# Patient Record
Sex: FEMALE | Race: BLACK OR AFRICAN AMERICAN | NOT HISPANIC OR LATINO | Employment: FULL TIME | URBAN - METROPOLITAN AREA
[De-identification: names, ages, dates, MRNs, and addresses within clinical notes are randomized per-mention and may not be internally consistent; named-entity substitution may affect disease eponyms.]

---

## 2022-08-15 ENCOUNTER — APPOINTMENT (OUTPATIENT)
Dept: ULTRASOUND IMAGING | Facility: CLINIC | Age: 22
End: 2022-08-15
Attending: EMERGENCY MEDICINE
Payer: MEDICAID

## 2022-08-15 ENCOUNTER — HOSPITAL ENCOUNTER (EMERGENCY)
Facility: CLINIC | Age: 22
Discharge: HOME | End: 2022-08-15
Attending: EMERGENCY MEDICINE | Admitting: EMERGENCY MEDICINE
Payer: MEDICAID

## 2022-08-15 VITALS
WEIGHT: 150 LBS | HEIGHT: 60 IN | TEMPERATURE: 97.1 F | DIASTOLIC BLOOD PRESSURE: 76 MMHG | HEART RATE: 85 BPM | RESPIRATION RATE: 16 BRPM | SYSTOLIC BLOOD PRESSURE: 121 MMHG | OXYGEN SATURATION: 99 % | BODY MASS INDEX: 29.45 KG/M2

## 2022-08-15 DIAGNOSIS — O26.891 ABDOMINAL PAIN DURING PREGNANCY IN FIRST TRIMESTER: ICD-10-CM

## 2022-08-15 DIAGNOSIS — R10.9 ABDOMINAL PAIN DURING PREGNANCY IN FIRST TRIMESTER: ICD-10-CM

## 2022-08-15 DIAGNOSIS — K29.00 ACUTE SUPERFICIAL GASTRITIS WITHOUT HEMORRHAGE: ICD-10-CM

## 2022-08-15 PROBLEM — F12.90 MARIJUANA USE: Status: ACTIVE | Noted: 2018-05-08

## 2022-08-15 PROBLEM — O99.019 ANTEPARTUM ANEMIA: Status: ACTIVE | Noted: 2018-05-08

## 2022-08-15 PROBLEM — N20.0 RECURRENT KIDNEY STONES: Status: ACTIVE | Noted: 2020-03-01

## 2022-08-15 LAB
ALBUMIN SERPL-MCNC: 3.8 G/DL (ref 3.5–5)
ALP SERPL-CCNC: 66 U/L (ref 45–120)
ALT SERPL W P-5'-P-CCNC: <9 U/L (ref 0–45)
ANION GAP SERPL CALCULATED.3IONS-SCNC: 7 MMOL/L (ref 5–18)
AST SERPL W P-5'-P-CCNC: 15 U/L (ref 0–40)
BILIRUB DIRECT SERPL-MCNC: <0.1 MG/DL
BILIRUB SERPL-MCNC: 0.2 MG/DL (ref 0–1)
BUN SERPL-MCNC: 10 MG/DL (ref 8–22)
CALCIUM SERPL-MCNC: 8.9 MG/DL (ref 8.5–10.5)
CHLORIDE BLD-SCNC: 105 MMOL/L (ref 98–107)
CO2 SERPL-SCNC: 24 MMOL/L (ref 22–31)
CREAT SERPL-MCNC: 0.63 MG/DL (ref 0.6–1.1)
ERYTHROCYTE [DISTWIDTH] IN BLOOD BY AUTOMATED COUNT: 20.4 % (ref 10–15)
GFR SERPL CREATININE-BSD FRML MDRD: >90 ML/MIN/1.73M2
GLUCOSE BLD-MCNC: 88 MG/DL (ref 70–125)
HCG SERPL-ACNC: ABNORMAL MLU/ML (ref 0–4)
HCT VFR BLD AUTO: 31.8 % (ref 35–47)
HGB BLD-MCNC: 10.1 G/DL (ref 11.7–15.7)
LIPASE SERPL-CCNC: 14 U/L (ref 0–52)
MCH RBC QN AUTO: 23 PG (ref 26.5–33)
MCHC RBC AUTO-ENTMCNC: 31.8 G/DL (ref 31.5–36.5)
MCV RBC AUTO: 72 FL (ref 78–100)
PLATELET # BLD AUTO: 310 10E3/UL (ref 150–450)
POTASSIUM BLD-SCNC: 3.8 MMOL/L (ref 3.5–5)
PROT SERPL-MCNC: 7.2 G/DL (ref 6–8)
RBC # BLD AUTO: 4.4 10E6/UL (ref 3.8–5.2)
SODIUM SERPL-SCNC: 136 MMOL/L (ref 136–145)
WBC # BLD AUTO: 13.6 10E3/UL (ref 4–11)

## 2022-08-15 PROCEDURE — 83690 ASSAY OF LIPASE: CPT | Performed by: STUDENT IN AN ORGANIZED HEALTH CARE EDUCATION/TRAINING PROGRAM

## 2022-08-15 PROCEDURE — 76801 OB US < 14 WKS SINGLE FETUS: CPT

## 2022-08-15 PROCEDURE — 85027 COMPLETE CBC AUTOMATED: CPT | Performed by: STUDENT IN AN ORGANIZED HEALTH CARE EDUCATION/TRAINING PROGRAM

## 2022-08-15 PROCEDURE — 99285 EMERGENCY DEPT VISIT HI MDM: CPT | Mod: 25

## 2022-08-15 PROCEDURE — 250N000013 HC RX MED GY IP 250 OP 250 PS 637: Performed by: EMERGENCY MEDICINE

## 2022-08-15 PROCEDURE — 80048 BASIC METABOLIC PNL TOTAL CA: CPT | Performed by: STUDENT IN AN ORGANIZED HEALTH CARE EDUCATION/TRAINING PROGRAM

## 2022-08-15 PROCEDURE — 76705 ECHO EXAM OF ABDOMEN: CPT

## 2022-08-15 PROCEDURE — 82248 BILIRUBIN DIRECT: CPT | Performed by: STUDENT IN AN ORGANIZED HEALTH CARE EDUCATION/TRAINING PROGRAM

## 2022-08-15 PROCEDURE — 84702 CHORIONIC GONADOTROPIN TEST: CPT | Performed by: EMERGENCY MEDICINE

## 2022-08-15 PROCEDURE — 36415 COLL VENOUS BLD VENIPUNCTURE: CPT | Performed by: STUDENT IN AN ORGANIZED HEALTH CARE EDUCATION/TRAINING PROGRAM

## 2022-08-15 RX ORDER — MAGNESIUM HYDROXIDE/ALUMINUM HYDROXICE/SIMETHICONE 120; 1200; 1200 MG/30ML; MG/30ML; MG/30ML
30 SUSPENSION ORAL ONCE
Status: COMPLETED | OUTPATIENT
Start: 2022-08-15 | End: 2022-08-15

## 2022-08-15 RX ADMIN — ALUMINUM HYDROXIDE, MAGNESIUM HYDROXIDE, AND SIMETHICONE 30 ML: 200; 200; 20 SUSPENSION ORAL at 21:12

## 2022-08-15 ASSESSMENT — ENCOUNTER SYMPTOMS
PALPITATIONS: 0
ABDOMINAL PAIN: 1
SEIZURES: 0
CHEST TIGHTNESS: 0
NAUSEA: 0
CHILLS: 0
FEVER: 0
SHORTNESS OF BREATH: 0
VOMITING: 0

## 2022-08-15 ASSESSMENT — ACTIVITIES OF DAILY LIVING (ADL)
ADLS_ACUITY_SCORE: 35
ADLS_ACUITY_SCORE: 35

## 2022-08-16 NOTE — DISCHARGE INSTRUCTIONS
Overall your work-up was reassuring.  Recommend over-the-counter Maalox for management of your symptoms.  Likely this is due to some mild inflammation of your stomach.  If you have escalating lower abdominal pain develop additional concern please return to the emergency department for repeat assessment.

## 2022-08-16 NOTE — ED TRIAGE NOTES
Arrives to ED with c/o sharp epigastric abd pain that began 1 hour PTA. Pt woke from nap with pain. Denies N/V. Approximately 9 weeks pregnant. .      Triage Assessment     Row Name 08/15/22 2026       Triage Assessment (Adult)    Airway WDL WDL       Respiratory WDL    Respiratory WDL WDL       Skin Circulation/Temperature WDL    Skin Circulation/Temperature WDL WDL       Cardiac WDL    Cardiac WDL WDL       Peripheral/Neurovascular WDL    Peripheral Neurovascular WDL WDL       Cognitive/Neuro/Behavioral WDL    Cognitive/Neuro/Behavioral WDL WDL

## 2022-08-16 NOTE — ED PROVIDER NOTES
EMERGENCY DEPARTMENT ENCOUNTER      NAME: Carmela Elizalde  AGE: 22 year old female  YOB: 2000  MRN: [id number]  EVALUATION DATE & TIME: 8/15/2022  8:51 PM    PCP: No primary care provider on file.    ED PROVIDER: Bryce Palafox MD    Chief Complaint   Patient presents with     Abdominal Pain     FINAL IMPRESSION:  1. Acute superficial gastritis without hemorrhage    2. Abdominal pain during pregnancy in first trimester    Pelvic US pending  UA pending    ED COURSE & MEDICAL DECISION MAKING:    Pertinent Labs & Imaging studies reviewed. (See chart for details)  22 year old female presents to the Emergency Department for evaluation of abdominal pain.  Patient is a G4, P3 at 9 weeks gestation by LMP presenting to the emergency department with epigastric abdominal pain.  Patient reports that she took a nap this afternoon and she awoke the pain was present.  Rates it as a 5 out of 10.  Upper abdomen.  Denies lower abdominal discomfort.  This is patient's fourth pregnancy.  No previous complications with her initial 3 pregnancies.  She has not seen any obstetrician in follow-up yet there is been no imaging.  Denies vaginal pain vaginal bleeding or discharge.  On examination patient had focal discomfort to palpation of the mid abdomen.  Primarily in epigastric region.  No guarding or peritoneal signs no appreciable distention or palpable masses.  No other concerning exam findings.  Laboratory testing obtained demonstrating beta-hCG of 171,000.  White blood cell count mildly elevated at 13.6 likely related to pregnancy.  Hemoglobin 10.1.  BMP unremarkable.  She had no right lower quadrant pain to palpation and no left lower quadrant pain for palpation.  Seem less likely that this was pregnancy related based on the location of pain she notes pain.  I favor gastritis as etiology for her discomfort.  She had never suffered from that condition previously.  I recommended right upper quadrant ultrasound to  exclude biliary colic.  This was negative.  I went in and reassessed the patient.  She had no response to the antacid therapy that I administered to the patient.  Repeat examination demonstrating persistent abdominal discomfort in the mid to upper abdomen primarily superior to the umbilicus by a few centimeters.  Given the persistence of her pain, lack of response to an acids and no previous ultrasound to demonstrate intrauterine pregnancy I did recommend a pelvic ultrasound to exclude ectopic pregnancy.  Again no right lower quadrant or left lower quadrant pain to palpation.  Low pretest probability for other acute abdominal pathology.  Still awaiting urinalysis.  still would favor gastritis as the most likely source of her pain.  Overall plan of care is for pelvic ultrasound and repeat assessment post results.  Patient signed out to oncoming physician at this point in care.  Anticipate discharge if pelvic ultrasound demonstrating intrauterine pregnancy without other complications.     At the conclusion of the encounter I discussed the results of all of the tests and the disposition. The questions were answered. The patient or family acknowledged understanding and was agreeable with the care plan.       MEDICATIONS GIVEN IN THE EMERGENCY:  Medications   alum & mag hydroxide-simethicone (MAALOX) suspension 30 mL (30 mLs Oral Given 8/15/22 2112)       NEW PRESCRIPTIONS STARTED AT TODAY'S ER VISIT  New Prescriptions    No medications on file          =================================================================    HPI    Patient information was obtained from: patient     Use of : N/A       Carmela OLIVERA Tonio is a 22 year old female with a pertinent history of antepartum anemia, kidney infection, and recurrent kidney stones who presents to this ED by private vehicle for evaluation of epigastric abdominal pain.    The patient woke up from a nap at ~8:20 PM this evening with sharp epigastric pain which has  remained constant since. The patient is ~9 weeks into her 4th pregnancy. She has 3 children at home. She denies any nausea, vomiting, or vaginal discharge.  She reports eating normally today and did not have this pain, other abdominal pain, or acid reflux in any of her prior pregnancies. Patient denies additional medical concerns or complaints at this time.     Of note, the patient's last menstrual period was ~June 16.    REVIEW OF SYSTEMS   Review of Systems   Constitutional: Negative for chills and fever.   Respiratory: Negative for chest tightness and shortness of breath.    Cardiovascular: Negative for chest pain and palpitations.   Gastrointestinal: Positive for abdominal pain (epigastric, sharp). Negative for nausea and vomiting.   Genitourinary: Negative for vaginal bleeding and vaginal discharge.   Neurological: Negative for seizures and syncope.       PAST MEDICAL HISTORY:  No past medical history on file.    PAST SURGICAL HISTORY:  No past surgical history on file.        CURRENT MEDICATIONS:    No current outpatient medications on file.      ALLERGIES:  No Known Allergies    FAMILY HISTORY:  No family history on file.    SOCIAL HISTORY:   Social History     Socioeconomic History     Marital status: Single       VITALS:  /76   Pulse 85   Temp 97.1  F (36.2  C) (Temporal)   Resp 16   Ht 1.524 m (5')   Wt 68 kg (150 lb)   SpO2 99%   BMI 29.29 kg/m      PHYSICAL EXAM    PHYSICAL EXAM    Constitutional: Well developed, Well nourished, NAD  HENT: Normocephalic, Atraumatic, Bilateral external ears normal, Oropharynx normal, mucous membranes moist, Nose normal. Neck-  Normal range of motion, No tenderness, Supple, No stridor.   Eyes: PERRL, EOMI, Conjunctiva normal, No discharge.   Respiratory: Normal breath sounds, No respiratory distress, No wheezing, Speaks full sentences easily. No cough.   Cardiovascular: Normal heart rate, Regular rhythm, No murmurs Chest wall nontender.    GI: Epigastric  abdominal pain to palpation  : No cva tenderness    Musculoskeletal: 2+ DP pulses. No edema. No cyanosis. Good range of motion in all major joints. No tenderness to palpation. No tenderness of the CTLS spine.   Integument: Warm, Dry, No erythema, No rash. No petechiae.   Neurologic: Alert & oriented x 3, Normal motor function, Normal sensory function, No focal deficits noted.   Psychiatric: Affect normal, Judgment normal, Mood normal. Cooperative.     LAB:  All pertinent labs reviewed and interpreted.  Results for orders placed or performed during the hospital encounter of 08/15/22   Abdomen US, limited (RUQ only)    Impression    IMPRESSION:  1.  Normal limited abdominal ultrasound.       CBC (+ platelets, no diff)   Result Value Ref Range    WBC Count 13.6 (H) 4.0 - 11.0 10e3/uL    RBC Count 4.40 3.80 - 5.20 10e6/uL    Hemoglobin 10.1 (L) 11.7 - 15.7 g/dL    Hematocrit 31.8 (L) 35.0 - 47.0 %    MCV 72 (L) 78 - 100 fL    MCH 23.0 (L) 26.5 - 33.0 pg    MCHC 31.8 31.5 - 36.5 g/dL    RDW 20.4 (H) 10.0 - 15.0 %    Platelet Count 310 150 - 450 10e3/uL   Basic metabolic panel   Result Value Ref Range    Sodium 136 136 - 145 mmol/L    Potassium 3.8 3.5 - 5.0 mmol/L    Chloride 105 98 - 107 mmol/L    Carbon Dioxide (CO2) 24 22 - 31 mmol/L    Anion Gap 7 5 - 18 mmol/L    Urea Nitrogen 10 8 - 22 mg/dL    Creatinine 0.63 0.60 - 1.10 mg/dL    Calcium 8.9 8.5 - 10.5 mg/dL    Glucose 88 70 - 125 mg/dL    GFR Estimate >90 >60 mL/min/1.73m2   Hepatic function panel   Result Value Ref Range    Bilirubin Total 0.2 0.0 - 1.0 mg/dL    Bilirubin Direct <0.1 <=0.5 mg/dL    Protein Total 7.2 6.0 - 8.0 g/dL    Albumin 3.8 3.5 - 5.0 g/dL    Alkaline Phosphatase 66 45 - 120 U/L    AST 15 0 - 40 U/L    ALT <9 0 - 45 U/L   Result Value Ref Range    Lipase 14 0 - 52 U/L   HCG quantitative pregnancy (blood)   Result Value Ref Range    hCG Quantitative 171,116 (H) 0 - 4 mlU/mL     RADIOLOGY:  Reviewed all pertinent imaging. Please see official  radiology report.  Abdomen US, limited (RUQ only)   Final Result   IMPRESSION:   1.  Normal limited abdominal ultrasound.            OB  US 1st trimester w transvag    (Results Pending)     I, Alba Villarreal, am serving as a scribe to document services personally performed by Bryce Palafox MD based on my observation and the provider's statements to me. I, Bryce Palafox MD attest that Alba Villarreal is acting in a scribe capacity, has observed my performance of the services and has documented them in accordance with my direction.    Bryce Palafox MD  [facility] EMERGENCY ROOM  [address] [facility] [facility] [id number] [phone]     Bryce Palafox MD  08/15/22 1970

## 2022-08-16 NOTE — ED NOTES
EMERGENCY DEPARTMENT SIGN OUT NOTE        ED COURSE AND MEDICAL DECISION MAKING  Patient was signed out to me by Dr Bryce Palafox at 10:26 PM.    In brief, Carmela Elizalde is a 22 year old female who initially presented abdominal pain.      Patient woke up from a nap this evening with sharp epigastric pain which has remained constant since. Patient notes she is 9 weeks into her 4th pregnancy. She denies nausea, vomiting, or vaginal discharge. She notes she did not have this pain in any of her other pregnancy's.      At time of sign out, disposition was pending a UA, pelvis ultrasound, and discharge.     Patient's ultrasound was negative.  When I went to go find the patient discussed these findings patient had left.  She had not given a UA as of yet.  I do not see any emergent cause.  Hopefully she will follow-up with her primary.    FINAL IMPRESSION    1. Acute superficial gastritis without hemorrhage    2. Abdominal pain during pregnancy in first trimester        ED MEDS  Medications   alum & mag hydroxide-simethicone (MAALOX) suspension 30 mL (30 mLs Oral Given 8/15/22 2112)       LAB  Labs Ordered and Resulted from Time of ED Arrival to Time of ED Departure   CBC WITH PLATELETS - Abnormal       Result Value    WBC Count 13.6 (*)     RBC Count 4.40      Hemoglobin 10.1 (*)     Hematocrit 31.8 (*)     MCV 72 (*)     MCH 23.0 (*)     MCHC 31.8      RDW 20.4 (*)     Platelet Count 310     HCG QUANTITATIVE PREGNANCY - Abnormal    hCG Quantitative 171,116 (*)    BASIC METABOLIC PANEL - Normal    Sodium 136      Potassium 3.8      Chloride 105      Carbon Dioxide (CO2) 24      Anion Gap 7      Urea Nitrogen 10      Creatinine 0.63      Calcium 8.9      Glucose 88      GFR Estimate >90     HEPATIC FUNCTION PANEL - Normal    Bilirubin Total 0.2      Bilirubin Direct <0.1      Protein Total 7.2      Albumin 3.8      Alkaline Phosphatase 66      AST 15      ALT <9     LIPASE - Normal    Lipase 14     ROUTINE UA WITH  MICROSCOPIC REFLEX TO CULTURE       RADIOLOGY    Abdomen US, limited (RUQ only)   Final Result   IMPRESSION:   1.  Normal limited abdominal ultrasound.            OB  US 1st trimester w transvag    (Results Pending)       DISCHARGE MEDS  New Prescriptions    No medications on file       Devang Bedolla MD  [facility] EMERGENCY ROOM  [address] [place] [id number] [phone]     Devang Bedolla MD  08/15/22 0024

## 2022-10-13 LAB
ABO (EXTERNAL): NORMAL
HEPATITIS B SURFACE ANTIGEN (EXTERNAL): NEGATIVE
HIV1+2 AB SERPL QL IA: NEGATIVE
RH (EXTERNAL): POSITIVE
RUBELLA ANTIBODY IGG (EXTERNAL): NORMAL

## 2022-10-14 DIAGNOSIS — O99.011 ANEMIA COMPLICATING PREGNANCY IN FIRST TRIMESTER: Primary | ICD-10-CM

## 2022-10-14 RX ORDER — METHYLPREDNISOLONE SODIUM SUCCINATE 125 MG/2ML
125 INJECTION, POWDER, LYOPHILIZED, FOR SOLUTION INTRAMUSCULAR; INTRAVENOUS
Status: CANCELLED
Start: 2022-10-17

## 2022-10-14 RX ORDER — MEPERIDINE HYDROCHLORIDE 25 MG/ML
25 INJECTION INTRAMUSCULAR; INTRAVENOUS; SUBCUTANEOUS EVERY 30 MIN PRN
Status: CANCELLED | OUTPATIENT
Start: 2022-10-17

## 2022-10-14 RX ORDER — ALBUTEROL SULFATE 90 UG/1
1-2 AEROSOL, METERED RESPIRATORY (INHALATION)
Status: CANCELLED
Start: 2022-10-17

## 2022-10-14 RX ORDER — ALBUTEROL SULFATE 0.83 MG/ML
2.5 SOLUTION RESPIRATORY (INHALATION)
Status: CANCELLED | OUTPATIENT
Start: 2022-10-17

## 2022-10-14 RX ORDER — HEPARIN SODIUM (PORCINE) LOCK FLUSH IV SOLN 100 UNIT/ML 100 UNIT/ML
5 SOLUTION INTRAVENOUS
Status: CANCELLED | OUTPATIENT
Start: 2022-10-17

## 2022-10-14 RX ORDER — EPINEPHRINE 1 MG/ML
0.3 INJECTION, SOLUTION INTRAMUSCULAR; SUBCUTANEOUS EVERY 5 MIN PRN
Status: CANCELLED | OUTPATIENT
Start: 2022-10-17

## 2022-10-14 RX ORDER — DIPHENHYDRAMINE HYDROCHLORIDE 50 MG/ML
50 INJECTION INTRAMUSCULAR; INTRAVENOUS
Status: CANCELLED
Start: 2022-10-17

## 2022-10-14 RX ORDER — HEPARIN SODIUM,PORCINE 10 UNIT/ML
5 VIAL (ML) INTRAVENOUS
Status: CANCELLED | OUTPATIENT
Start: 2022-10-17

## 2022-12-19 ENCOUNTER — TRANSFERRED RECORDS (OUTPATIENT)
Dept: HEALTH INFORMATION MANAGEMENT | Facility: CLINIC | Age: 22
End: 2022-12-19

## 2022-12-19 LAB — VDRL (SYPHILIS) (EXTERNAL): NONREACTIVE

## 2022-12-22 ENCOUNTER — MEDICAL CORRESPONDENCE (OUTPATIENT)
Dept: HEALTH INFORMATION MANAGEMENT | Facility: CLINIC | Age: 22
End: 2022-12-22

## 2022-12-24 ENCOUNTER — APPOINTMENT (OUTPATIENT)
Dept: CT IMAGING | Facility: CLINIC | Age: 22
End: 2022-12-24
Attending: EMERGENCY MEDICINE
Payer: COMMERCIAL

## 2022-12-24 ENCOUNTER — HOSPITAL ENCOUNTER (EMERGENCY)
Facility: CLINIC | Age: 22
Discharge: HOME | End: 2022-12-24
Attending: EMERGENCY MEDICINE | Admitting: EMERGENCY MEDICINE
Payer: COMMERCIAL

## 2022-12-24 VITALS
RESPIRATION RATE: 16 BRPM | SYSTOLIC BLOOD PRESSURE: 130 MMHG | TEMPERATURE: 98.2 F | OXYGEN SATURATION: 99 % | DIASTOLIC BLOOD PRESSURE: 70 MMHG | HEART RATE: 108 BPM

## 2022-12-24 DIAGNOSIS — R00.0 TACHYCARDIA: ICD-10-CM

## 2022-12-24 DIAGNOSIS — R06.02 SHORTNESS OF BREATH: ICD-10-CM

## 2022-12-24 DIAGNOSIS — R07.9 CHEST PAIN: ICD-10-CM

## 2022-12-24 DIAGNOSIS — J10.1 INFLUENZA A: ICD-10-CM

## 2022-12-24 LAB
ALBUMIN SERPL-MCNC: 3.1 G/DL (ref 3.5–5)
ALP SERPL-CCNC: 119 U/L (ref 45–120)
ALT SERPL W P-5'-P-CCNC: 17 U/L (ref 0–45)
ANION GAP SERPL CALCULATED.3IONS-SCNC: 9 MMOL/L (ref 5–18)
AST SERPL W P-5'-P-CCNC: 20 U/L (ref 0–40)
ATRIAL RATE - MUSE: 131 BPM
BILIRUB SERPL-MCNC: <0.1 MG/DL (ref 0–1)
BUN SERPL-MCNC: 3 MG/DL (ref 8–22)
CALCIUM SERPL-MCNC: 8.7 MG/DL (ref 8.5–10.5)
CHLORIDE BLD-SCNC: 106 MMOL/L (ref 98–107)
CO2 SERPL-SCNC: 21 MMOL/L (ref 22–31)
CREAT SERPL-MCNC: 0.57 MG/DL (ref 0.6–1.1)
D DIMER PPP FEU-MCNC: 1.03 UG/ML FEU (ref 0–0.5)
DIASTOLIC BLOOD PRESSURE - MUSE: NORMAL MMHG
ERYTHROCYTE [DISTWIDTH] IN BLOOD BY AUTOMATED COUNT: 14.6 % (ref 10–15)
FLUAV RNA SPEC QL NAA+PROBE: POSITIVE
FLUBV RNA RESP QL NAA+PROBE: NEGATIVE
GFR SERPL CREATININE-BSD FRML MDRD: >90 ML/MIN/1.73M2
GLUCOSE BLD-MCNC: 91 MG/DL (ref 70–125)
HCT VFR BLD AUTO: 29.2 % (ref 35–47)
HGB BLD-MCNC: 9.3 G/DL (ref 11.7–15.7)
HOLD SPECIMEN: NORMAL
HOLD SPECIMEN: NORMAL
INTERPRETATION ECG - MUSE: NORMAL
LIPASE SERPL-CCNC: 17 U/L (ref 0–52)
MAGNESIUM SERPL-MCNC: 1.8 MG/DL (ref 1.8–2.6)
MCH RBC QN AUTO: 24.6 PG (ref 26.5–33)
MCHC RBC AUTO-ENTMCNC: 31.8 G/DL (ref 31.5–36.5)
MCV RBC AUTO: 77 FL (ref 78–100)
P AXIS - MUSE: 45 DEGREES
PLATELET # BLD AUTO: 248 10E3/UL (ref 150–450)
POTASSIUM BLD-SCNC: 3.8 MMOL/L (ref 3.5–5)
PR INTERVAL - MUSE: 124 MS
PROT SERPL-MCNC: 6.8 G/DL (ref 6–8)
QRS DURATION - MUSE: 88 MS
QT - MUSE: 290 MS
QTC - MUSE: 428 MS
R AXIS - MUSE: 62 DEGREES
RBC # BLD AUTO: 3.78 10E6/UL (ref 3.8–5.2)
RSV RNA SPEC NAA+PROBE: NEGATIVE
SARS-COV-2 RNA RESP QL NAA+PROBE: NEGATIVE
SODIUM SERPL-SCNC: 136 MMOL/L (ref 136–145)
SYSTOLIC BLOOD PRESSURE - MUSE: NORMAL MMHG
T AXIS - MUSE: -76 DEGREES
TROPONIN I SERPL-MCNC: <0.01 NG/ML (ref 0–0.29)
VENTRICULAR RATE- MUSE: 131 BPM
WBC # BLD AUTO: 10.4 10E3/UL (ref 4–11)

## 2022-12-24 PROCEDURE — 84484 ASSAY OF TROPONIN QUANT: CPT | Performed by: EMERGENCY MEDICINE

## 2022-12-24 PROCEDURE — 85027 COMPLETE CBC AUTOMATED: CPT | Performed by: EMERGENCY MEDICINE

## 2022-12-24 PROCEDURE — 250N000013 HC RX MED GY IP 250 OP 250 PS 637: Performed by: EMERGENCY MEDICINE

## 2022-12-24 PROCEDURE — 83690 ASSAY OF LIPASE: CPT | Performed by: EMERGENCY MEDICINE

## 2022-12-24 PROCEDURE — 85379 FIBRIN DEGRADATION QUANT: CPT | Performed by: EMERGENCY MEDICINE

## 2022-12-24 PROCEDURE — 96361 HYDRATE IV INFUSION ADD-ON: CPT

## 2022-12-24 PROCEDURE — C9803 HOPD COVID-19 SPEC COLLECT: HCPCS

## 2022-12-24 PROCEDURE — 80053 COMPREHEN METABOLIC PANEL: CPT | Performed by: EMERGENCY MEDICINE

## 2022-12-24 PROCEDURE — 83735 ASSAY OF MAGNESIUM: CPT | Performed by: EMERGENCY MEDICINE

## 2022-12-24 PROCEDURE — 36415 COLL VENOUS BLD VENIPUNCTURE: CPT | Performed by: EMERGENCY MEDICINE

## 2022-12-24 PROCEDURE — 93005 ELECTROCARDIOGRAM TRACING: CPT | Performed by: EMERGENCY MEDICINE

## 2022-12-24 PROCEDURE — 99285 EMERGENCY DEPT VISIT HI MDM: CPT | Mod: CS,25

## 2022-12-24 PROCEDURE — 87637 SARSCOV2&INF A&B&RSV AMP PRB: CPT | Performed by: EMERGENCY MEDICINE

## 2022-12-24 PROCEDURE — 96360 HYDRATION IV INFUSION INIT: CPT | Mod: 59

## 2022-12-24 PROCEDURE — 82040 ASSAY OF SERUM ALBUMIN: CPT | Performed by: EMERGENCY MEDICINE

## 2022-12-24 PROCEDURE — 258N000003 HC RX IP 258 OP 636: Performed by: EMERGENCY MEDICINE

## 2022-12-24 PROCEDURE — 250N000011 HC RX IP 250 OP 636: Performed by: EMERGENCY MEDICINE

## 2022-12-24 PROCEDURE — 71275 CT ANGIOGRAPHY CHEST: CPT

## 2022-12-24 RX ORDER — ACETAMINOPHEN 325 MG/1
650 TABLET ORAL ONCE
Status: COMPLETED | OUTPATIENT
Start: 2022-12-24 | End: 2022-12-24

## 2022-12-24 RX ORDER — IOPAMIDOL 755 MG/ML
55 INJECTION, SOLUTION INTRAVASCULAR ONCE
Status: COMPLETED | OUTPATIENT
Start: 2022-12-24 | End: 2022-12-24

## 2022-12-24 RX ADMIN — SODIUM CHLORIDE 500 ML: 9 INJECTION, SOLUTION INTRAVENOUS at 15:41

## 2022-12-24 RX ADMIN — SODIUM CHLORIDE 1000 ML: 9 INJECTION, SOLUTION INTRAVENOUS at 13:23

## 2022-12-24 RX ADMIN — IOPAMIDOL 55 ML: 755 INJECTION, SOLUTION INTRAVENOUS at 15:20

## 2022-12-24 RX ADMIN — ACETAMINOPHEN 650 MG: 325 TABLET ORAL at 13:58

## 2022-12-24 ASSESSMENT — ENCOUNTER SYMPTOMS
VOMITING: 0
SHORTNESS OF BREATH: 1
DIARRHEA: 0
TROUBLE SWALLOWING: 0
ABDOMINAL PAIN: 0
BLOOD IN STOOL: 0
COUGH: 1
FEVER: 1
NAUSEA: 0
SORE THROAT: 0

## 2022-12-24 ASSESSMENT — ACTIVITIES OF DAILY LIVING (ADL)
ADLS_ACUITY_SCORE: 35
ADLS_ACUITY_SCORE: 33

## 2022-12-24 NOTE — DISCHARGE INSTRUCTIONS
You were seen and evaluated here in the ED for evaluation of your chest pain, shortness of breath and cough. Fortunately, you do not have a blood clot in your lungs or dissection as the cause of your chest pain and shortness of breath. You did test positive for influenza A and I do think this is the cause of your symptoms. You are outside the window for Tamiflu. I recommend continued rest, fluids and Tylenol for your pain and fevers.     I recommend staying away from others until you are fever free for 24 hours without the need for Tylenol.     Return with any worsening symptoms, difficulty breathing or other concerning symptoms.

## 2022-12-24 NOTE — ED PROVIDER NOTES
Emergency Department Midlevel Supervisory Note     I personally saw the patient and performed a substantive portion of the visit including all aspects of the medical decision making.    ED Course:  2:02 PM  Anita Rain PA-C staffed patient with me. I agree with their assessment and plan of management, and I will see the patient.  1330 I met with the patient to introduce myself, gather additional history, perform my initial exam, and discuss the plan.     Brief HPI:     Carmela Elizalde is a 22 year old female who presents for evaluation of shortness of breath pleurisy and fever.  Noted to be tachycardic.  Patient is pregnant.  She is otherwise healthy.  Pregnancy so far has been progressing without issue.  Please see DEMI note for further details on HPI.    I, Kirby Ghotra, am serving as a scribe to document services personally performed by Bryce Palafox MD based on my observations and the provider's statements to me.   I, Bryce Palafox MD, attest that Kirby Ghotra was acting in a scribe capacity, has observed my performance of the services and has documented them in accordance with my direction.    Brief Physical Exam: /70   Pulse 108   Temp 98.2  F (36.8  C)   Resp 16   SpO2 99%   Constitutional:  Alert, in no acute distress  EYES: Conjunctivae clear  HENT:  Atraumatic, normocephalic  Respiratory:  Respirations even, unlabored, in no acute respiratory distress  Cardiovascular:   Tachycardia, no murmur  GI: Soft, nondistended, nontender, no palpable masses, no rebound, no guarding   Musculoskeletal:  No edema. No cyanosis. Range of motion major extremities intact.    Integument: Warm, Dry, No erythema, No rash.   Neurologic:  Alert & oriented, no focal deficits noted  Psych: Normal mood and affect     MDM:  Patient presenting with chest pain symptoms pleuritic in nature associated tachycardia.  Found to be influenza positive which I do feel is the primary source of her symptoms.  There was a  pleuritic component to her symptoms and given escalated risk for pulmonary embolism given her pregnancy status so we ultimately did proceed with CT scan imaging.  Her heart rate trended in a positive direction and ultimately she was improved with an otherwise negative work-up.  I think were appropriate for discharge home with follow-up on outpatient basis.  Please see DEMI note for further details.       1. Influenza A    2. Chest pain    3. Shortness of breath    4. Tachycardia        Labs and Imaging:  Results for orders placed or performed during the hospital encounter of 12/24/22   CT Chest Pulmonary Embolism w Contrast    Impression    IMPRESSION:  1.  No pulmonary emboli on either side. Lungs are clear. Mild diffuse thickening of the bronchi. No adenopathy or pleural effusion on either side.    2.  Normal caliber thoracic aorta without aneurysm or dissection. Normal cardiac size. No pericardial effusion.   Extra Red Top Tube   Result Value Ref Range    Hold Specimen JIC    Extra Green Top (Lithium Heparin) ON ICE   Result Value Ref Range    Hold Specimen JIC    CBC (+ platelets, no diff)   Result Value Ref Range    WBC Count 10.4 4.0 - 11.0 10e3/uL    RBC Count 3.78 (L) 3.80 - 5.20 10e6/uL    Hemoglobin 9.3 (L) 11.7 - 15.7 g/dL    Hematocrit 29.2 (L) 35.0 - 47.0 %    MCV 77 (L) 78 - 100 fL    MCH 24.6 (L) 26.5 - 33.0 pg    MCHC 31.8 31.5 - 36.5 g/dL    RDW 14.6 10.0 - 15.0 %    Platelet Count 248 150 - 450 10e3/uL   Comprehensive metabolic panel   Result Value Ref Range    Sodium 136 136 - 145 mmol/L    Potassium 3.8 3.5 - 5.0 mmol/L    Chloride 106 98 - 107 mmol/L    Carbon Dioxide (CO2) 21 (L) 22 - 31 mmol/L    Anion Gap 9 5 - 18 mmol/L    Urea Nitrogen 3 (L) 8 - 22 mg/dL    Creatinine 0.57 (L) 0.60 - 1.10 mg/dL    Calcium 8.7 8.5 - 10.5 mg/dL    Glucose 91 70 - 125 mg/dL    Alkaline Phosphatase 119 45 - 120 U/L    AST 20 0 - 40 U/L    ALT 17 0 - 45 U/L    Protein Total 6.8 6.0 - 8.0 g/dL    Albumin 3.1 (L)  3.5 - 5.0 g/dL    Bilirubin Total <0.1 0.0 - 1.0 mg/dL    GFR Estimate >90 >60 mL/min/1.73m2   Result Value Ref Range    Magnesium 1.8 1.8 - 2.6 mg/dL   Result Value Ref Range    Lipase 17 0 - 52 U/L   Symptomatic Influenza A/B & SARS-CoV2 (COVID-19) Virus PCR Multiplex Nose    Specimen: Nose; Swab   Result Value Ref Range    Influenza A PCR Positive (A) Negative    Influenza B PCR Negative Negative    RSV PCR Negative Negative    SARS CoV2 PCR Negative Negative   Troponin I (now)   Result Value Ref Range    Troponin I <0.01 0.00 - 0.29 ng/mL   D dimer quantitative   Result Value Ref Range    D-Dimer Quantitative 1.03 (H) 0.00 - 0.50 ug/mL FEU   ECG 12-LEAD WITH MUSE ([facility])   Result Value Ref Range    Systolic Blood Pressure  mmHg    Diastolic Blood Pressure  mmHg    Ventricular Rate 131 BPM    Atrial Rate 131 BPM    CO Interval 124 ms    QRS Duration 88 ms     ms    QTc 428 ms    P Axis 45 degrees    R AXIS 62 degrees    T Axis -76 degrees    Interpretation ECG       Sinus tachycardia  ST & T wave abnormality, consider inferior ischemia  ST & T wave abnormality, consider anterolateral ischemia  Abnormal ECG  When compared with ECG of 24-DEC-2022 12:50,  No significant change was found  Confirmed by SEE ED PROVIDER NOTE FOR, ECG INTERPRETATION ([id number]),  Ramiro Coello ([id number]) on 12/24/2022 3:45:59 PM       I have reviewed the relevant laboratory and radiology studies    Procedures:  I was present for the key portions of this procedure: none    Bryce Palafox MD   [facility] EMERGENCY ROOM  [address] [facility] [id number] [phone]     Bryce Palafox MD  12/24/22 3881

## 2022-12-24 NOTE — ED TRIAGE NOTES
Pt presents to the ED with c/o CP and SOB that has been going on for a few days. Pt is 30 weeks pregnant. Denies any abdominal pain or any complications r/t pregnancy. Pt endorses a constant mid CP that does not radiate anywhere, increased upon deep breathe. Highest fever at home was 102F. Denies any N/V.      Triage Assessment     Row Name 12/24/22 1241       Triage Assessment (Adult)    Airway WDL WDL       Respiratory WDL    Respiratory WDL X;rhythm/pattern    Rhythm/Pattern, Respiratory shortness of breath       Skin Circulation/Temperature WDL    Skin Circulation/Temperature WDL WDL       Cardiac WDL    Cardiac WDL X;chest pain       Chest Pain Assessment    Chest Pain Location midsternal       Peripheral/Neurovascular WDL    Peripheral Neurovascular WDL WDL       Cognitive/Neuro/Behavioral WDL    Cognitive/Neuro/Behavioral WDL WDL

## 2022-12-24 NOTE — ED PROVIDER NOTES
EMERGENCY DEPARTMENT ENCOUNTER      NAME: Carmela OLIVERA Young  AGE: 22 year old female  YOB: 2000  MRN: [id number]  EVALUATION DATE & TIME: 12/24/2022 12:55 PM    PCP: No Ref-Primary, Physician    ED PROVIDER: Anita Rain PA-C      Chief Complaint   Patient presents with     Chest Pain     Shortness of Breath     Fever         FINAL IMPRESSION:  1. Influenza A    2. Chest pain    3. Shortness of breath    4. Tachycardia        MEDICAL DECISION MAKING:    Pertinent Labs & Imaging studies reviewed. (See chart for details)  22 year old female presents to the Emergency Department for evaluation of chest pain and shortness of breath.  The past 4 days the patient has had chest pain, shortness of breath and cough with associated fevers.  Her symptoms have been continuing and she was concerned so she presented to the emergency department.  On my evaluation, the patient was tachycardic to the 130s but otherwise vitally stable.  Examination with lungs clear to auscultation bilaterally and heart and tachycardic rate but regular rhythm.  No lower extremity edema appreciated.  Abdomen was gravid but soft and nontender.  Differential diagnosis included, but is not limited to, electrolyte derangement, arrhythmia, PE, ACS, pneumonia, pneumothorax, COVID, influenza, pancreatitis.      CBC with slightly low hemoglobin of 9.3 which is not significantly changed from 4 months ago at 10.1 and she denies any bleeding and I do think that she is slightly anemic due to her pregnancy as she has had a history of this.  CMP with no significant derangements.  Magnesium was normal at 1.8.  Lipase was normal at 70.  EKG with sinus tachycardia without any ST or T wave changes concerning for ACS.  Troponin was negative at < 0.010 and with several days of chest pain I do not feel ACS is the likely cause of her symptoms.  With her pregnancy, tachycardia, chest pain especially worsening with deep breathing and shortness of breath I did  feel D-dimer was indicated.  I discussed obtaining this test with the patient and that if this returns positive that we need to image her chest with a CT PE study.  We discussed risks versus benefits as she is 30 weeks pregnant and she was in agreement and understanding.  D-dimer did return positive at 1.03 and I again discussed with the patient the need for the CT PE study of her chest and she was in agreement and understanding.  Fortunately, CT PE study did not show any signs of PE or dissection.  Influenza, COVID, RSV testing returned positive for influenza A.  She was given Tylenol and fluids here in the emergency department with some improvement of her symptoms.  With fluids she did have improvement of her tachycardia however, she did still remain slightly tachycardic.  I do feel he of this tachycardia is due to her influenza infection with negative CT PE study.  The patient is outside the window for Tamiflu.  I feel comfortable sending her home with symptomatic cares with fluids, rest and Tylenol as needed for pain and fevers. I discussed results and plan of care with the patient and she was in agreement and understanding.  She should follow-up with her primary care provider/OB/GYN as scheduled. Return precautions were discussed. All questions were answered to the best my ability and she was discharged in the emergency department in stable condition.    ED COURSE:  1:03 PM I met with the patient, obtained history, performed an initial exam, and discussed options and plan for diagnostics and treatment here in the ED.    3:00 PM I discussed results and need for CT PE study with elevated D-dimer.  Risks versus benefits were discussed of imaging during pregnancy and she was in agreement and understanding.  She would like to move forward with imaging at this time.  Influenza/COVID tests are still pending at this time.    4:30 PM patient was feeling somewhat improved with the Tylenol and fluids here in the emergency  department.  Her tachycardia improved to below 110 and I feel comfortable with discharging her home.  I discussed symptomatic management and follow-up with primary care as needed. Patient discharged after being provided with extensive anticipatory guidance and given return precautions, importance of PCP follow-up emphasized.    At the conclusion of the encounter I discussed the results of all of the tests and the disposition. The questions were answered. The patient or family acknowledged understanding and was agreeable with the care plan.     MEDICATIONS GIVEN IN THE EMERGENCY:  Medications   0.9% sodium chloride BOLUS (0 mLs Intravenous Stopped 22 1541)   acetaminophen (TYLENOL) tablet 650 mg (650 mg Oral Given 22 1358)   0.9% sodium chloride BOLUS (0 mLs Intravenous Stopped 22 1651)   iopamidol (ISOVUE-370) solution 55 mL (55 mLs Intravenous Given 22 1520)       NEW PRESCRIPTIONS STARTED AT TODAY'S ER VISIT  There are no discharge medications for this patient.           =================================================================    HPI:    Patient information was obtained from: The patient    Use of Interpretor: N/A         Carmela OLIVERA Tonio is a 22 year old  female who presents to this ED via private vehicle for evaluation of chest pain and shortness of breath.  For the past 4 days the patient has had chest pain, cough and shortness of breath that has become significantly worse.  She was concerned about her symptoms and presented to the emergency department.  On my evaluation, the patient reports that the chest pain, cough and shortness of breath came on all at the same time.  She states that the chest pain is constant and worsens with coughing and deep breathing.  She reports feeling short of breath especially with exertion.  She has had decreased appetite, but has been able to drink plenty of fluids over the past few days.  She reports fevers at home as high as 102  F, but she  has not taken any Tylenol for this.  She has not had any significant headaches, sore throat, difficulty swallowing, nausea, vomiting, abdominal pain, diarrhea, black or bloody stools, vaginal bleeding or discharge.  She has not had any leg swelling, recent travel, recent trauma, hemoptysis.    She denies any known sick contacts however, patient is a  at [employer].    REVIEW OF SYSTEMS:  Review of Systems   Constitutional: Positive for fever.   HENT: Negative for sore throat and trouble swallowing.    Respiratory: Positive for cough and shortness of breath.    Cardiovascular: Positive for chest pain. Negative for leg swelling.   Gastrointestinal: Negative for abdominal pain, blood in stool, diarrhea, nausea and vomiting.   Genitourinary: Negative.    All other systems reviewed and are negative.         PAST MEDICAL HISTORY:  No past medical history on file.    PAST SURGICAL HISTORY:  No past surgical history on file.        CURRENT MEDICATIONS:    No current facility-administered medications for this encounter.  No current outpatient medications on file.      ALLERGIES:  No Known Allergies    FAMILY HISTORY:  No family history on file.    SOCIAL HISTORY:   Social History     Socioeconomic History     Marital status: Single       VITALS:  Patient Vitals for the past 24 hrs:   BP Temp Temp src Pulse Resp SpO2   12/24/22 1647 130/70 98.2  F (36.8  C) -- 108 16 99 %   12/24/22 1620 -- -- -- 109 16 99 %   12/24/22 1610 -- -- -- 107 -- 99 %   12/24/22 1600 -- -- -- 105 -- 100 %   12/24/22 1550 -- -- -- 107 -- 99 %   12/24/22 1540 135/75 -- -- 114 -- 100 %   12/24/22 1510 -- -- -- 106 -- 99 %   12/24/22 1500 -- -- -- 114 -- 100 %   12/24/22 1450 -- -- -- 114 -- 99 %   12/24/22 1440 -- -- -- 119 -- 100 %   12/24/22 1430 -- -- -- 108 -- 100 %   12/24/22 1420 -- -- -- 107 -- 100 %   12/24/22 1410 -- -- -- 110 -- 100 %   12/24/22 1400 -- -- -- 111 -- 100 %   12/24/22 1350 -- -- -- (!) 122 -- 100 %   12/24/22 1345 -- -- --  (!) 122 -- 100 %   12/24/22 1340 -- -- -- 116 -- 100 %   12/24/22 1330 127/69 -- -- (!) 124 -- 100 %   12/24/22 1310 -- 98.2  F (36.8  C) Oral 112 16 --   12/24/22 1252 -- -- -- (!) 133 -- --   12/24/22 1247 132/77 98.8  F (37.1  C) Oral -- 20 100 %       PHYSICAL EXAM    Constitutional: Well developed, Well nourished, NAD  HENT: Normocephalic, Atraumatic, Bilateral external ears normal, Oropharynx normal, mucous membranes moist, Nose normal.   Neck: Normal range of motion, No tenderness, Supple, No stridor.  Eyes: Eyes track normally throughout exam, conjunctiva normal, No discharge.   Respiratory: Normal breath sounds, No respiratory distress, No wheezing, Speaks full sentences easily. No cough.  Cardiovascular: Normal heart rate, Regular rhythm, No murmurs, No rubs, No gallops. Chest wall nontender.  GI: Gravid abdomen.  Soft, No tenderness, No masses, No flank tenderness. No rebound or guarding.  Musculoskeletal: 2+ DP pulses. No edema. No cyanosis, No clubbing. Good range of motion in all major joints. No tenderness to palpation or major deformities noted.   Integument: Warm, Dry, No erythema, No rash. No petechiae.  Neurologic: Alert & oriented x 3, Normal motor function, Normal sensory function, No focal deficits noted. Normal gait.  Psychiatric: Affect normal, Judgment normal, Mood normal. Cooperative.    LAB:  All pertinent labs reviewed and interpreted.  Recent Results (from the past 24 hour(s))   ECG 12-LEAD WITH MUSE (E)    Collection Time: 12/24/22 12:53 PM   Result Value Ref Range    Systolic Blood Pressure  mmHg    Diastolic Blood Pressure  mmHg    Ventricular Rate 131 BPM    Atrial Rate 131 BPM    VA Interval 124 ms    QRS Duration 88 ms     ms    QTc 428 ms    P Axis 45 degrees    R AXIS 62 degrees    T Axis -76 degrees    Interpretation ECG       Sinus tachycardia  ST & T wave abnormality, consider inferior ischemia  ST & T wave abnormality, consider anterolateral ischemia  Abnormal  ECG  When compared with ECG of 24-DEC-2022 12:50,  No significant change was found  Confirmed by SEE ED PROVIDER NOTE FOR, ECG INTERPRETATION ([id number]),  Ramiro Coello ([id number]) on 12/24/2022 3:45:59 PM     CBC (+ platelets, no diff)    Collection Time: 12/24/22  1:19 PM   Result Value Ref Range    WBC Count 10.4 4.0 - 11.0 10e3/uL    RBC Count 3.78 (L) 3.80 - 5.20 10e6/uL    Hemoglobin 9.3 (L) 11.7 - 15.7 g/dL    Hematocrit 29.2 (L) 35.0 - 47.0 %    MCV 77 (L) 78 - 100 fL    MCH 24.6 (L) 26.5 - 33.0 pg    MCHC 31.8 31.5 - 36.5 g/dL    RDW 14.6 10.0 - 15.0 %    Platelet Count 248 150 - 450 10e3/uL   Comprehensive metabolic panel    Collection Time: 12/24/22  1:19 PM   Result Value Ref Range    Sodium 136 136 - 145 mmol/L    Potassium 3.8 3.5 - 5.0 mmol/L    Chloride 106 98 - 107 mmol/L    Carbon Dioxide (CO2) 21 (L) 22 - 31 mmol/L    Anion Gap 9 5 - 18 mmol/L    Urea Nitrogen 3 (L) 8 - 22 mg/dL    Creatinine 0.57 (L) 0.60 - 1.10 mg/dL    Calcium 8.7 8.5 - 10.5 mg/dL    Glucose 91 70 - 125 mg/dL    Alkaline Phosphatase 119 45 - 120 U/L    AST 20 0 - 40 U/L    ALT 17 0 - 45 U/L    Protein Total 6.8 6.0 - 8.0 g/dL    Albumin 3.1 (L) 3.5 - 5.0 g/dL    Bilirubin Total <0.1 0.0 - 1.0 mg/dL    GFR Estimate >90 >60 mL/min/1.73m2   Magnesium    Collection Time: 12/24/22  1:19 PM   Result Value Ref Range    Magnesium 1.8 1.8 - 2.6 mg/dL   Lipase    Collection Time: 12/24/22  1:19 PM   Result Value Ref Range    Lipase 17 0 - 52 U/L   Troponin I (now)    Collection Time: 12/24/22  1:19 PM   Result Value Ref Range    Troponin I <0.01 0.00 - 0.29 ng/mL   D dimer quantitative    Collection Time: 12/24/22  1:19 PM   Result Value Ref Range    D-Dimer Quantitative 1.03 (H) 0.00 - 0.50 ug/mL FEU   Extra Red Top Tube    Collection Time: 12/24/22  1:20 PM   Result Value Ref Range    Hold Specimen JIC    Extra Green Top (Lithium Heparin) ON ICE    Collection Time: 12/24/22  1:20 PM   Result Value Ref Range    Hold Specimen JIC     Symptomatic Influenza A/B & SARS-CoV2 (COVID-19) Virus PCR Multiplex Nose    Collection Time: 12/24/22  1:22 PM    Specimen: Nose; Swab   Result Value Ref Range    Influenza A PCR Positive (A) Negative    Influenza B PCR Negative Negative    RSV PCR Negative Negative    SARS CoV2 PCR Negative Negative         RADIOLOGY:  Reviewed all pertinent imaging. Please see official radiology report.  CT Chest Pulmonary Embolism w Contrast   Final Result   IMPRESSION:   1.  No pulmonary emboli on either side. Lungs are clear. Mild diffuse thickening of the bronchi. No adenopathy or pleural effusion on either side.      2.  Normal caliber thoracic aorta without aneurysm or dissection. Normal cardiac size. No pericardial effusion.          Anita Rain PA-C  Emergency Medicine  [facility]  12/24/2022      Anita Rain PA-C  12/24/22 2017

## 2022-12-24 NOTE — Clinical Note
Carmela Elizalde was seen and treated in our emergency department on 12/24/2022.  She may return to work on 12/27/2022.       If you have any questions or concerns, please don't hesitate to call.      Anita Rain PA-C

## 2022-12-26 ENCOUNTER — TELEPHONE (OUTPATIENT)
Dept: INFUSION THERAPY | Facility: CLINIC | Age: 22
End: 2022-12-26

## 2022-12-26 DIAGNOSIS — D50.9 IRON DEFICIENCY ANEMIA, UNSPECIFIED IRON DEFICIENCY ANEMIA TYPE: Primary | ICD-10-CM

## 2022-12-26 RX ORDER — METHYLPREDNISOLONE SODIUM SUCCINATE 125 MG/2ML
125 INJECTION, POWDER, LYOPHILIZED, FOR SOLUTION INTRAMUSCULAR; INTRAVENOUS
Status: CANCELLED
Start: 2022-12-26

## 2022-12-26 RX ORDER — EPINEPHRINE 1 MG/ML
0.3 INJECTION, SOLUTION INTRAMUSCULAR; SUBCUTANEOUS EVERY 5 MIN PRN
Status: CANCELLED | OUTPATIENT
Start: 2022-12-26

## 2022-12-26 RX ORDER — ALBUTEROL SULFATE 0.83 MG/ML
2.5 SOLUTION RESPIRATORY (INHALATION)
Status: CANCELLED | OUTPATIENT
Start: 2022-12-26

## 2022-12-26 RX ORDER — ALBUTEROL SULFATE 90 UG/1
1-2 AEROSOL, METERED RESPIRATORY (INHALATION)
Status: CANCELLED
Start: 2022-12-26

## 2022-12-26 RX ORDER — DIPHENHYDRAMINE HYDROCHLORIDE 50 MG/ML
50 INJECTION INTRAMUSCULAR; INTRAVENOUS
Status: CANCELLED
Start: 2022-12-26

## 2022-12-26 RX ORDER — HEPARIN SODIUM,PORCINE 10 UNIT/ML
5 VIAL (ML) INTRAVENOUS
Status: CANCELLED | OUTPATIENT
Start: 2022-12-26

## 2022-12-26 RX ORDER — MEPERIDINE HYDROCHLORIDE 25 MG/ML
25 INJECTION INTRAMUSCULAR; INTRAVENOUS; SUBCUTANEOUS EVERY 30 MIN PRN
Status: CANCELLED | OUTPATIENT
Start: 2022-12-26

## 2022-12-26 RX ORDER — HEPARIN SODIUM (PORCINE) LOCK FLUSH IV SOLN 100 UNIT/ML 100 UNIT/ML
5 SOLUTION INTRAVENOUS
Status: CANCELLED | OUTPATIENT
Start: 2022-12-26

## 2023-01-05 ENCOUNTER — TELEPHONE (OUTPATIENT)
Dept: ONCOLOGY | Facility: CLINIC | Age: 23
End: 2023-01-05

## 2023-02-04 ENCOUNTER — HOSPITAL ENCOUNTER (OUTPATIENT)
Facility: CLINIC | Age: 23
Discharge: HOME | End: 2023-02-04
Attending: ADVANCED PRACTICE MIDWIFE | Admitting: ADVANCED PRACTICE MIDWIFE
Payer: COMMERCIAL

## 2023-02-04 VITALS
TEMPERATURE: 98.2 F | RESPIRATION RATE: 16 BRPM | SYSTOLIC BLOOD PRESSURE: 134 MMHG | DIASTOLIC BLOOD PRESSURE: 63 MMHG | OXYGEN SATURATION: 100 %

## 2023-02-04 DIAGNOSIS — A59.9 TRICHIMONIASIS: Primary | ICD-10-CM

## 2023-02-04 LAB
ALBUMIN UR-MCNC: NEGATIVE MG/DL
APPEARANCE UR: ABNORMAL
BACTERIA #/AREA URNS HPF: ABNORMAL /HPF
BILIRUB UR QL STRIP: NEGATIVE
CLUE CELLS: ABNORMAL
COLOR UR AUTO: ABNORMAL
GLUCOSE UR STRIP-MCNC: NEGATIVE MG/DL
HGB UR QL STRIP: NEGATIVE
KETONES UR STRIP-MCNC: NEGATIVE MG/DL
LEUKOCYTE ESTERASE UR QL STRIP: ABNORMAL
NITRATE UR QL: NEGATIVE
PH UR STRIP: 6 [PH] (ref 5–7)
RBC URINE: 23 /HPF
SP GR UR STRIP: 1.01 (ref 1–1.03)
SQUAMOUS EPITHELIAL: 11 /HPF
TRICHOMONAS #/AREA URNS HPF: PRESENT /HPF
TRICHOMONAS, WET PREP: PRESENT
UROBILINOGEN UR STRIP-MCNC: <2 MG/DL
WBC URINE: 24 /HPF
WBC'S/HIGH POWER FIELD, WET PREP: ABNORMAL
YEAST, WET PREP: ABNORMAL

## 2023-02-04 PROCEDURE — 87491 CHLMYD TRACH DNA AMP PROBE: CPT | Performed by: ADVANCED PRACTICE MIDWIFE

## 2023-02-04 PROCEDURE — 87591 N.GONORRHOEAE DNA AMP PROB: CPT | Performed by: ADVANCED PRACTICE MIDWIFE

## 2023-02-04 PROCEDURE — G0463 HOSPITAL OUTPT CLINIC VISIT: HCPCS

## 2023-02-04 PROCEDURE — 81001 URINALYSIS AUTO W/SCOPE: CPT | Performed by: ADVANCED PRACTICE MIDWIFE

## 2023-02-04 PROCEDURE — 87210 SMEAR WET MOUNT SALINE/INK: CPT | Performed by: ADVANCED PRACTICE MIDWIFE

## 2023-02-04 PROCEDURE — 250N000013 HC RX MED GY IP 250 OP 250 PS 637: Performed by: ADVANCED PRACTICE MIDWIFE

## 2023-02-04 PROCEDURE — 87086 URINE CULTURE/COLONY COUNT: CPT | Performed by: ADVANCED PRACTICE MIDWIFE

## 2023-02-04 RX ORDER — METRONIDAZOLE 500 MG/1
500 TABLET ORAL 2 TIMES DAILY
Qty: 14 TABLET | Refills: 0 | Status: SHIPPED | OUTPATIENT
Start: 2023-02-04 | End: 2023-02-11

## 2023-02-04 RX ORDER — LIDOCAINE 40 MG/G
CREAM TOPICAL
Status: DISCONTINUED | OUTPATIENT
Start: 2023-02-04 | End: 2023-02-04 | Stop reason: HOSPADM

## 2023-02-04 RX ORDER — METRONIDAZOLE 500 MG/1
500 TABLET ORAL ONCE
Status: COMPLETED | OUTPATIENT
Start: 2023-02-04 | End: 2023-02-04

## 2023-02-04 RX ADMIN — METRONIDAZOLE 500 MG: 500 TABLET ORAL at 12:41

## 2023-02-04 ASSESSMENT — ACTIVITIES OF DAILY LIVING (ADL): ADLS_ACUITY_SCORE: 35

## 2023-02-04 NOTE — PROGRESS NOTES
"Pt arrives to unit walking per self and is alone.  She states she called last night c/o contractions and vaginal pain, but decided to wait to come in until now.  She states the contractions are every 5-10 minutes and rates them 6/10.  She describes the vaginal pain as heaviness with an occasional sharp pain.  She also c/o \"funky discharge\" that started this AM.  She shows this writer a picture of discharge from a void this AM.  The picture showed several gray clumps of discharge in the toilet bowl.  She states this is similar to the discharge she has with an STI infection in December.  Candace Horner notified of pt presence and gave verbal orders for labs. Cat I tracing with uterine irritability.  UA and wet prep sent, and will collect GC with next void.    "

## 2023-02-04 NOTE — PROGRESS NOTES
Wet prep came back positive for Trichomonas.  Urine sent for CG and lab called to report the specimen was too large.  Candace Siri notified and a vaginal swab was performed.  Given first dose of Flagyl prior to discharge.  Reviewed discharge instructions and when to return to care as well as how to take Flagyl.  Pt states she has no questions or concerns.  Discharged to home and walked off unit per self.

## 2023-02-04 NOTE — DISCHARGE INSTRUCTIONS
Discharge Instruction for Undelivered Patients      You were seen for: Vaginal pain  We Consulted: Candace Horner CNM  You had (Test or Medicine):Urinalysis, Wet Prep, Chlamydia/Gonorrhea Screen     Diet:   Drink 8 to 12 glasses of liquids (milk, juice, water) every day.  You may eat meals and snacks.     Activity:  Call your doctor or nurse midwife if your baby is moving less than usual.     Call your provider if you notice:  Swelling in your face or increased swelling in your hands or legs.  Headaches that are not relieved by Tylenol (acetaminophen).  Changes in your vision (blurring: seeing spots or stars.)  Nausea (sick to your stomach) and vomiting (throwing up).   Weight gain of 5 pounds or more per week.  Heartburn that doesn't go away.  Signs of bladder infection: pain when you urinate (use the toilet), need to go more often and more urgently.  The bag of mccray (rupture of membranes) breaks, or you notice leaking in your underwear.  Bright red blood in your underwear.  Abdominal (lower belly) or stomach pain.  For first baby: Contractions (tightening) less than 5 minutes apart for one hour or more.  Second (plus) baby: Contractions (tightening) less than 10 minutes apart and getting stronger.  *If less than 34 weeks: Contractions (tightening) more than 6 times in one hour.  Increase or change in vaginal discharge (note the color and amount)  Other:     Follow-up:  As scheduled in the clinic

## 2023-02-04 NOTE — PROGRESS NOTES
Outpatient/Triage Note:    Patient Name:  Carmela Elizalde  :      2000  MRN:      [id number]    Subjective:  Carmela Elizalde is a 22 year old  at 35.4 weeks, who presented to [facility] for evaluation of contractions, vaginal pain, vaginal heaviness, and discharge. Denies leaking of fluid, bleeding, or changes in fetal movement. Hx trich dx  and BV/yeast 23. No hx of  labor or birth. She reports she completed course of flagyl for trichomoniasis in [facility]. She reports her partner received the medication but unsure if he completed course.    Objective:  Vital signs: /63 (BP Location: Right arm, Patient Position: Semi-Cannon's, Cuff Size: Adult Regular)   Temp 98.2  F (36.8  C) (Oral)   Resp 16   SpO2 100%   FHR: Category 1  Uterine contractions: irritability/rare  NST reactive    Physical Exam: A&Ox3  Abdomen: SIUP,  by Leopold's, abdomen non-tender  SVE: Closed/thick/erlin at 1215 by this writer.   Legs: no edema, moves freely    Results:  Recent Results (from the past 168 hour(s))   UA with Microscopic reflex to Culture    Specimen: Urine, Clean Catch   Result Value Ref Range Status    Color Urine Light Yellow Colorless, Straw, Light Yellow, Yellow Final    Appearance Urine Turbid (A) Clear Final    Glucose Urine Negative Negative mg/dL Final    Bilirubin Urine Negative Negative Final    Ketones Urine Negative Negative mg/dL Final    Specific Gravity Urine 1.008 1.001 - 1.030 Final    Blood Urine Negative Negative Final    pH Urine 6.0 5.0 - 7.0 Final    Protein Albumin Urine Negative Negative mg/dL Final    Urobilinogen Urine <2.0 <2.0 mg/dL Final    Nitrite Urine Negative Negative Final    Leukocyte Esterase Urine 500 Rush/uL (A) Negative Final    Bacteria Urine Few (A) None Seen /HPF Final    Trichomonas Urine Present (A) None Seen /HPF Final    RBC Urine 23 (H) <=2 /HPF Final    WBC Urine 24 (H) <=5 /HPF Final    Squamous Epithelials Urine 11 (H) <=1 /HPF Final     *Note: Due  to a large number of results and/or encounters for the requested time period, some results have not been displayed. A complete set of results can be found in Results Review.      -Wet mount: + trichomoniasis  -Gonorrhea/chlamydia swab pending    Assessment:   @ 35w4d with positive trichomoniasis    Plan:   -Push oral fluids   -Discussed need for re-treatment. Will give 1 dose of flagyl prior to discharge and remaining course sent to her pharmacy. Complete entire course of flagyl. No intercourse until all partners have completed antibiotic therapy AND symptoms have resolved. She feels comfortable notifying partner.   -Discharge to home undelivered. Reviewed warning signs including decreased fetal movement, leaking of fluid, vaginal bleeding, or signs of labor. Reviewed how to contact on-call provider. Follow-up in clinic with OB provider as scheduled or sooner as needed. All questions answered. Agrees with plan.     Provider: ELLIOT Alegria CNM

## 2023-02-05 LAB
BACTERIA UR CULT: NO GROWTH
C TRACH DNA SPEC QL PROBE+SIG AMP: POSITIVE
N GONORRHOEA DNA SPEC QL NAA+PROBE: NEGATIVE

## 2023-02-23 ENCOUNTER — HOSPITAL ENCOUNTER (OUTPATIENT)
Facility: CLINIC | Age: 23
Discharge: HOME | End: 2023-02-23
Attending: ADVANCED PRACTICE MIDWIFE | Admitting: ADVANCED PRACTICE MIDWIFE
Payer: COMMERCIAL

## 2023-02-23 VITALS
DIASTOLIC BLOOD PRESSURE: 70 MMHG | SYSTOLIC BLOOD PRESSURE: 130 MMHG | BODY MASS INDEX: 30.43 KG/M2 | HEIGHT: 60 IN | WEIGHT: 155 LBS

## 2023-02-23 PROCEDURE — G0463 HOSPITAL OUTPT CLINIC VISIT: HCPCS

## 2023-02-23 RX ORDER — LIDOCAINE 40 MG/G
CREAM TOPICAL
Status: DISCONTINUED | OUTPATIENT
Start: 2023-02-23 | End: 2023-02-23 | Stop reason: HOSPADM

## 2023-02-23 ASSESSMENT — ACTIVITIES OF DAILY LIVING (ADL): ADLS_ACUITY_SCORE: 31

## 2023-02-23 NOTE — PROGRESS NOTES
Outpatient/Triage Note:    Patient Name:  Carmela Elizalde  :      2000  MRN:      [id number]      Assessment:   @ 38w2d here for evaluation of pelvic discomfort.     Plan:   -Cervix is not dilated   -Irritability of uterus  - Discharge to home undelivered. Reviewed warning signs including decreased fetal movement, leaking of fluid, vaginal bleeding, or signs of labor. Reviewed how to contact on-call provider. Follow-up in clinic with OB provider as scheduled or sooner as needed. All questions answered. Agrees with plan.     Subjective:  Carmela Elizalde is a 22 year old  at 38 weeks, who presented to [facility] for evaluation of pelvic discomfort. Denies leaking of fluid, bleeding, or changes in fetal movement.     Objective:  Vital signs: /70   Ht 1.524 m (5')   Wt 70.3 kg (155 lb)   BMI 30.27 kg/m        Reactive NST        Results:  No results found for this or any previous visit (from the past 168 hour(s)).      Provider: ELLIOT Barrett CNM

## 2023-02-23 NOTE — PLAN OF CARE
Patient came in do to she was having back pain and hip pain band she is a scheduled section.  This pain is constant.  She states that she never goes in to labor and just has baby when she gets painful.  Baby is category 1 tracing.  Let josue Robbins CNM that patient is not dilated and we are going to send patient home long as baby looks good on monitor and contractions are spread out. Vitals are stable.  Patient was upset that could not have her section and instead would go to [place] to see if they will give her a  section.

## 2023-03-08 ENCOUNTER — HOSPITAL ENCOUNTER (INPATIENT)
Facility: CLINIC | Age: 23
LOS: 2 days | Discharge: HOME | End: 2023-03-10
Attending: OBSTETRICS & GYNECOLOGY | Admitting: OBSTETRICS & GYNECOLOGY
Payer: COMMERCIAL

## 2023-03-08 ENCOUNTER — ANESTHESIA EVENT (OUTPATIENT)
Dept: OBGYN | Facility: CLINIC | Age: 23
End: 2023-03-08
Payer: COMMERCIAL

## 2023-03-08 ENCOUNTER — ANESTHESIA (OUTPATIENT)
Dept: OBGYN | Facility: CLINIC | Age: 23
End: 2023-03-08
Payer: COMMERCIAL

## 2023-03-08 LAB
ABO/RH(D): NORMAL
ANTIBODY SCREEN: NEGATIVE
HGB BLD-MCNC: 9.8 G/DL (ref 11.7–15.7)
SPECIMEN EXPIRATION DATE: NORMAL

## 2023-03-08 PROCEDURE — C9290 INJ, BUPIVACAINE LIPOSOME: HCPCS | Performed by: ANESTHESIOLOGY

## 2023-03-08 PROCEDURE — 258N000003 HC RX IP 258 OP 636: Performed by: OBSTETRICS & GYNECOLOGY

## 2023-03-08 PROCEDURE — 88302 TISSUE EXAM BY PATHOLOGIST: CPT | Mod: TC | Performed by: OBSTETRICS & GYNECOLOGY

## 2023-03-08 PROCEDURE — 250N000011 HC RX IP 250 OP 636: Performed by: NURSE ANESTHETIST, CERTIFIED REGISTERED

## 2023-03-08 PROCEDURE — 120N000001 HC R&B MED SURG/OB

## 2023-03-08 PROCEDURE — 250N000011 HC RX IP 250 OP 636: Performed by: OBSTETRICS & GYNECOLOGY

## 2023-03-08 PROCEDURE — 0UT70ZZ RESECTION OF BILATERAL FALLOPIAN TUBES, OPEN APPROACH: ICD-10-PCS | Performed by: OBSTETRICS & GYNECOLOGY

## 2023-03-08 PROCEDURE — 999N000249 HC STATISTIC C-SECTION ON UNIT

## 2023-03-08 PROCEDURE — 258N000003 HC RX IP 258 OP 636: Performed by: NURSE ANESTHETIST, CERTIFIED REGISTERED

## 2023-03-08 PROCEDURE — 272N000001 HC OR GENERAL SUPPLY STERILE: Performed by: OBSTETRICS & GYNECOLOGY

## 2023-03-08 PROCEDURE — 250N000011 HC RX IP 250 OP 636: Performed by: ANESTHESIOLOGY

## 2023-03-08 PROCEDURE — 360N000076 HC SURGERY LEVEL 3, PER MIN: Performed by: OBSTETRICS & GYNECOLOGY

## 2023-03-08 PROCEDURE — 88302 TISSUE EXAM BY PATHOLOGIST: CPT | Mod: 26 | Performed by: PATHOLOGY

## 2023-03-08 PROCEDURE — 250N000009 HC RX 250: Performed by: OBSTETRICS & GYNECOLOGY

## 2023-03-08 PROCEDURE — 250N000013 HC RX MED GY IP 250 OP 250 PS 637: Performed by: OBSTETRICS & GYNECOLOGY

## 2023-03-08 PROCEDURE — 85018 HEMOGLOBIN: CPT | Performed by: OBSTETRICS & GYNECOLOGY

## 2023-03-08 PROCEDURE — 250N000009 HC RX 250: Performed by: NURSE ANESTHETIST, CERTIFIED REGISTERED

## 2023-03-08 PROCEDURE — 36415 COLL VENOUS BLD VENIPUNCTURE: CPT | Performed by: OBSTETRICS & GYNECOLOGY

## 2023-03-08 PROCEDURE — 86850 RBC ANTIBODY SCREEN: CPT | Performed by: OBSTETRICS & GYNECOLOGY

## 2023-03-08 PROCEDURE — 370N000017 HC ANESTHESIA TECHNICAL FEE, PER MIN: Performed by: OBSTETRICS & GYNECOLOGY

## 2023-03-08 RX ORDER — MISOPROSTOL 200 UG/1
800 TABLET ORAL
Status: DISCONTINUED | OUTPATIENT
Start: 2023-03-08 | End: 2023-03-10 | Stop reason: HOSPADM

## 2023-03-08 RX ORDER — DEXAMETHASONE SODIUM PHOSPHATE 4 MG/ML
INJECTION, SOLUTION INTRA-ARTICULAR; INTRALESIONAL; INTRAMUSCULAR; INTRAVENOUS; SOFT TISSUE PRN
Status: DISCONTINUED | OUTPATIENT
Start: 2023-03-08 | End: 2023-03-08

## 2023-03-08 RX ORDER — HYDROCORTISONE 25 MG/G
CREAM TOPICAL 3 TIMES DAILY PRN
Status: DISCONTINUED | OUTPATIENT
Start: 2023-03-08 | End: 2023-03-10 | Stop reason: HOSPADM

## 2023-03-08 RX ORDER — OXYTOCIN 10 [USP'U]/ML
10 INJECTION, SOLUTION INTRAMUSCULAR; INTRAVENOUS
Status: DISCONTINUED | OUTPATIENT
Start: 2023-03-08 | End: 2023-03-10 | Stop reason: HOSPADM

## 2023-03-08 RX ORDER — CARBOPROST TROMETHAMINE 250 UG/ML
250 INJECTION, SOLUTION INTRAMUSCULAR
Status: DISCONTINUED | OUTPATIENT
Start: 2023-03-08 | End: 2023-03-08 | Stop reason: HOSPADM

## 2023-03-08 RX ORDER — BISACODYL 10 MG
10 SUPPOSITORY, RECTAL RECTAL DAILY PRN
Status: DISCONTINUED | OUTPATIENT
Start: 2023-03-10 | End: 2023-03-10 | Stop reason: HOSPADM

## 2023-03-08 RX ORDER — MISOPROSTOL 200 UG/1
400 TABLET ORAL
Status: DISCONTINUED | OUTPATIENT
Start: 2023-03-08 | End: 2023-03-10 | Stop reason: HOSPADM

## 2023-03-08 RX ORDER — METOCLOPRAMIDE 10 MG/1
10 TABLET ORAL EVERY 6 HOURS PRN
Status: DISCONTINUED | OUTPATIENT
Start: 2023-03-08 | End: 2023-03-10 | Stop reason: HOSPADM

## 2023-03-08 RX ORDER — ACETAMINOPHEN 325 MG/1
975 TABLET ORAL EVERY 6 HOURS
Status: DISCONTINUED | OUTPATIENT
Start: 2023-03-08 | End: 2023-03-10 | Stop reason: HOSPADM

## 2023-03-08 RX ORDER — OXYCODONE HYDROCHLORIDE 5 MG/1
5 TABLET ORAL EVERY 4 HOURS PRN
Status: DISCONTINUED | OUTPATIENT
Start: 2023-03-08 | End: 2023-03-10 | Stop reason: HOSPADM

## 2023-03-08 RX ORDER — CEFAZOLIN SODIUM 2 G/100ML
2 INJECTION, SOLUTION INTRAVENOUS
Status: COMPLETED | OUTPATIENT
Start: 2023-03-08 | End: 2023-03-08

## 2023-03-08 RX ORDER — CARBOPROST TROMETHAMINE 250 UG/ML
250 INJECTION, SOLUTION INTRAMUSCULAR
Status: DISCONTINUED | OUTPATIENT
Start: 2023-03-08 | End: 2023-03-10 | Stop reason: HOSPADM

## 2023-03-08 RX ORDER — ACETAMINOPHEN 325 MG/1
975 TABLET ORAL ONCE
Status: COMPLETED | OUTPATIENT
Start: 2023-03-08 | End: 2023-03-08

## 2023-03-08 RX ORDER — HYDROMORPHONE HCL IN WATER/PF 6 MG/30 ML
0.4 PATIENT CONTROLLED ANALGESIA SYRINGE INTRAVENOUS EVERY 5 MIN PRN
Status: CANCELLED | OUTPATIENT
Start: 2023-03-08

## 2023-03-08 RX ORDER — SODIUM CHLORIDE, SODIUM LACTATE, POTASSIUM CHLORIDE, CALCIUM CHLORIDE 600; 310; 30; 20 MG/100ML; MG/100ML; MG/100ML; MG/100ML
INJECTION, SOLUTION INTRAVENOUS CONTINUOUS
Status: DISCONTINUED | OUTPATIENT
Start: 2023-03-08 | End: 2023-03-08 | Stop reason: HOSPADM

## 2023-03-08 RX ORDER — OXYTOCIN/0.9 % SODIUM CHLORIDE 30/500 ML
100-340 PLASTIC BAG, INJECTION (ML) INTRAVENOUS CONTINUOUS PRN
Status: DISCONTINUED | OUTPATIENT
Start: 2023-03-08 | End: 2023-03-10 | Stop reason: HOSPADM

## 2023-03-08 RX ORDER — NALOXONE HYDROCHLORIDE 0.4 MG/ML
0.2 INJECTION, SOLUTION INTRAMUSCULAR; INTRAVENOUS; SUBCUTANEOUS
Status: DISCONTINUED | OUTPATIENT
Start: 2023-03-08 | End: 2023-03-10 | Stop reason: HOSPADM

## 2023-03-08 RX ORDER — NALOXONE HYDROCHLORIDE 0.4 MG/ML
0.4 INJECTION, SOLUTION INTRAMUSCULAR; INTRAVENOUS; SUBCUTANEOUS
Status: DISCONTINUED | OUTPATIENT
Start: 2023-03-08 | End: 2023-03-10 | Stop reason: HOSPADM

## 2023-03-08 RX ORDER — IBUPROFEN 800 MG/1
800 TABLET, FILM COATED ORAL EVERY 6 HOURS
Status: DISCONTINUED | OUTPATIENT
Start: 2023-03-09 | End: 2023-03-10 | Stop reason: HOSPADM

## 2023-03-08 RX ORDER — OXYCODONE HYDROCHLORIDE 5 MG/1
10 TABLET ORAL
Status: CANCELLED | OUTPATIENT
Start: 2023-03-08

## 2023-03-08 RX ORDER — SODIUM CHLORIDE, SODIUM LACTATE, POTASSIUM CHLORIDE, CALCIUM CHLORIDE 600; 310; 30; 20 MG/100ML; MG/100ML; MG/100ML; MG/100ML
INJECTION, SOLUTION INTRAVENOUS CONTINUOUS
Status: CANCELLED | OUTPATIENT
Start: 2023-03-08

## 2023-03-08 RX ORDER — NALBUPHINE HYDROCHLORIDE 10 MG/ML
2.5-5 INJECTION, SOLUTION INTRAMUSCULAR; INTRAVENOUS; SUBCUTANEOUS EVERY 6 HOURS PRN
Status: DISCONTINUED | OUTPATIENT
Start: 2023-03-08 | End: 2023-03-10 | Stop reason: HOSPADM

## 2023-03-08 RX ORDER — AMOXICILLIN 250 MG
2 CAPSULE ORAL 2 TIMES DAILY
Status: DISCONTINUED | OUTPATIENT
Start: 2023-03-08 | End: 2023-03-10 | Stop reason: HOSPADM

## 2023-03-08 RX ORDER — MODIFIED LANOLIN
OINTMENT (GRAM) TOPICAL
Status: DISCONTINUED | OUTPATIENT
Start: 2023-03-08 | End: 2023-03-10 | Stop reason: HOSPADM

## 2023-03-08 RX ORDER — MISOPROSTOL 200 UG/1
800 TABLET ORAL
Status: DISCONTINUED | OUTPATIENT
Start: 2023-03-08 | End: 2023-03-08 | Stop reason: HOSPADM

## 2023-03-08 RX ORDER — BUPIVACAINE HYDROCHLORIDE 7.5 MG/ML
INJECTION, SOLUTION INTRASPINAL PRN
Status: DISCONTINUED | OUTPATIENT
Start: 2023-03-08 | End: 2023-03-08

## 2023-03-08 RX ORDER — FENTANYL CITRATE 50 UG/ML
50 INJECTION, SOLUTION INTRAMUSCULAR; INTRAVENOUS
Status: CANCELLED | OUTPATIENT
Start: 2023-03-08

## 2023-03-08 RX ORDER — LIDOCAINE 40 MG/G
CREAM TOPICAL
Status: DISCONTINUED | OUTPATIENT
Start: 2023-03-08 | End: 2023-03-08 | Stop reason: HOSPADM

## 2023-03-08 RX ORDER — LIDOCAINE 40 MG/G
CREAM TOPICAL
Status: DISCONTINUED | OUTPATIENT
Start: 2023-03-08 | End: 2023-03-10 | Stop reason: HOSPADM

## 2023-03-08 RX ORDER — CEFAZOLIN SODIUM 2 G/100ML
2 INJECTION, SOLUTION INTRAVENOUS SEE ADMIN INSTRUCTIONS
Status: DISCONTINUED | OUTPATIENT
Start: 2023-03-08 | End: 2023-03-08 | Stop reason: HOSPADM

## 2023-03-08 RX ORDER — OXYCODONE HYDROCHLORIDE 5 MG/1
5 TABLET ORAL
Status: CANCELLED | OUTPATIENT
Start: 2023-03-08

## 2023-03-08 RX ORDER — OXYTOCIN/0.9 % SODIUM CHLORIDE 30/500 ML
340 PLASTIC BAG, INJECTION (ML) INTRAVENOUS CONTINUOUS PRN
Status: DISCONTINUED | OUTPATIENT
Start: 2023-03-08 | End: 2023-03-10 | Stop reason: HOSPADM

## 2023-03-08 RX ORDER — PROCHLORPERAZINE MALEATE 10 MG
10 TABLET ORAL EVERY 6 HOURS PRN
Status: DISCONTINUED | OUTPATIENT
Start: 2023-03-08 | End: 2023-03-10 | Stop reason: HOSPADM

## 2023-03-08 RX ORDER — OXYTOCIN 10 [USP'U]/ML
10 INJECTION, SOLUTION INTRAMUSCULAR; INTRAVENOUS
Status: DISCONTINUED | OUTPATIENT
Start: 2023-03-08 | End: 2023-03-08 | Stop reason: HOSPADM

## 2023-03-08 RX ORDER — HYDROMORPHONE HCL IN WATER/PF 6 MG/30 ML
0.2 PATIENT CONTROLLED ANALGESIA SYRINGE INTRAVENOUS EVERY 5 MIN PRN
Status: CANCELLED | OUTPATIENT
Start: 2023-03-08

## 2023-03-08 RX ORDER — CITRIC ACID/SODIUM CITRATE 334-500MG
30 SOLUTION, ORAL ORAL
Status: COMPLETED | OUTPATIENT
Start: 2023-03-08 | End: 2023-03-08

## 2023-03-08 RX ORDER — AMOXICILLIN 250 MG
1 CAPSULE ORAL 2 TIMES DAILY
Status: DISCONTINUED | OUTPATIENT
Start: 2023-03-08 | End: 2023-03-10 | Stop reason: HOSPADM

## 2023-03-08 RX ORDER — ONDANSETRON 2 MG/ML
INJECTION INTRAMUSCULAR; INTRAVENOUS PRN
Status: DISCONTINUED | OUTPATIENT
Start: 2023-03-08 | End: 2023-03-08

## 2023-03-08 RX ORDER — SIMETHICONE 80 MG
80 TABLET,CHEWABLE ORAL 4 TIMES DAILY PRN
Status: DISCONTINUED | OUTPATIENT
Start: 2023-03-08 | End: 2023-03-10 | Stop reason: HOSPADM

## 2023-03-08 RX ORDER — FENTANYL CITRATE 50 UG/ML
100 INJECTION, SOLUTION INTRAMUSCULAR; INTRAVENOUS ONCE
Status: DISCONTINUED | OUTPATIENT
Start: 2023-03-08 | End: 2023-03-08 | Stop reason: HOSPADM

## 2023-03-08 RX ORDER — FENTANYL CITRATE 50 UG/ML
50 INJECTION, SOLUTION INTRAMUSCULAR; INTRAVENOUS EVERY 5 MIN PRN
Status: CANCELLED | OUTPATIENT
Start: 2023-03-08

## 2023-03-08 RX ORDER — ONDANSETRON 4 MG/1
4 TABLET, ORALLY DISINTEGRATING ORAL EVERY 30 MIN PRN
Status: CANCELLED | OUTPATIENT
Start: 2023-03-08

## 2023-03-08 RX ORDER — FENTANYL CITRATE 50 UG/ML
25 INJECTION, SOLUTION INTRAMUSCULAR; INTRAVENOUS EVERY 5 MIN PRN
Status: CANCELLED | OUTPATIENT
Start: 2023-03-08

## 2023-03-08 RX ORDER — DEXTROSE, SODIUM CHLORIDE, SODIUM LACTATE, POTASSIUM CHLORIDE, AND CALCIUM CHLORIDE 5; .6; .31; .03; .02 G/100ML; G/100ML; G/100ML; G/100ML; G/100ML
INJECTION, SOLUTION INTRAVENOUS CONTINUOUS
Status: DISCONTINUED | OUTPATIENT
Start: 2023-03-08 | End: 2023-03-10 | Stop reason: HOSPADM

## 2023-03-08 RX ORDER — ACETAMINOPHEN 325 MG/1
975 TABLET ORAL EVERY 6 HOURS
Status: DISCONTINUED | OUTPATIENT
Start: 2023-03-08 | End: 2023-03-08 | Stop reason: DRUGHIGH

## 2023-03-08 RX ORDER — OXYTOCIN/0.9 % SODIUM CHLORIDE 30/500 ML
PLASTIC BAG, INJECTION (ML) INTRAVENOUS CONTINUOUS PRN
Status: DISCONTINUED | OUTPATIENT
Start: 2023-03-08 | End: 2023-03-08

## 2023-03-08 RX ORDER — METHYLERGONOVINE MALEATE 0.2 MG/ML
200 INJECTION INTRAVENOUS
Status: DISCONTINUED | OUTPATIENT
Start: 2023-03-08 | End: 2023-03-10 | Stop reason: HOSPADM

## 2023-03-08 RX ORDER — MISOPROSTOL 200 UG/1
400 TABLET ORAL
Status: DISCONTINUED | OUTPATIENT
Start: 2023-03-08 | End: 2023-03-08 | Stop reason: HOSPADM

## 2023-03-08 RX ORDER — ONDANSETRON 2 MG/ML
4 INJECTION INTRAMUSCULAR; INTRAVENOUS EVERY 30 MIN PRN
Status: CANCELLED | OUTPATIENT
Start: 2023-03-08

## 2023-03-08 RX ORDER — MORPHINE SULFATE 0.5 MG/ML
INJECTION, SOLUTION EPIDURAL; INTRATHECAL; INTRAVENOUS PRN
Status: DISCONTINUED | OUTPATIENT
Start: 2023-03-08 | End: 2023-03-08

## 2023-03-08 RX ORDER — KETOROLAC TROMETHAMINE 30 MG/ML
30 INJECTION, SOLUTION INTRAMUSCULAR; INTRAVENOUS EVERY 6 HOURS
Status: COMPLETED | OUTPATIENT
Start: 2023-03-08 | End: 2023-03-09

## 2023-03-08 RX ORDER — METOCLOPRAMIDE HYDROCHLORIDE 5 MG/ML
10 INJECTION INTRAMUSCULAR; INTRAVENOUS EVERY 6 HOURS PRN
Status: DISCONTINUED | OUTPATIENT
Start: 2023-03-08 | End: 2023-03-10 | Stop reason: HOSPADM

## 2023-03-08 RX ORDER — PRENATAL VIT/IRON FUM/FOLIC AC 27MG-0.8MG
1 TABLET ORAL DAILY
COMMUNITY

## 2023-03-08 RX ORDER — ONDANSETRON 2 MG/ML
4 INJECTION INTRAMUSCULAR; INTRAVENOUS EVERY 6 HOURS PRN
Status: DISCONTINUED | OUTPATIENT
Start: 2023-03-08 | End: 2023-03-10 | Stop reason: HOSPADM

## 2023-03-08 RX ORDER — BUPIVACAINE HYDROCHLORIDE 2.5 MG/ML
INJECTION, SOLUTION EPIDURAL; INFILTRATION; INTRACAUDAL PRN
Status: DISCONTINUED | OUTPATIENT
Start: 2023-03-08 | End: 2023-03-08

## 2023-03-08 RX ORDER — PROCHLORPERAZINE 25 MG
25 SUPPOSITORY, RECTAL RECTAL EVERY 12 HOURS PRN
Status: DISCONTINUED | OUTPATIENT
Start: 2023-03-08 | End: 2023-03-10 | Stop reason: HOSPADM

## 2023-03-08 RX ORDER — ONDANSETRON 4 MG/1
4 TABLET, ORALLY DISINTEGRATING ORAL EVERY 6 HOURS PRN
Status: DISCONTINUED | OUTPATIENT
Start: 2023-03-08 | End: 2023-03-10 | Stop reason: HOSPADM

## 2023-03-08 RX ORDER — FENTANYL CITRATE 50 UG/ML
100 INJECTION, SOLUTION INTRAMUSCULAR; INTRAVENOUS
Status: DISCONTINUED | OUTPATIENT
Start: 2023-03-08 | End: 2023-03-08 | Stop reason: HOSPADM

## 2023-03-08 RX ORDER — FENTANYL CITRATE-0.9 % NACL/PF 10 MCG/ML
100 PLASTIC BAG, INJECTION (ML) INTRAVENOUS EVERY 5 MIN PRN
Status: DISCONTINUED | OUTPATIENT
Start: 2023-03-08 | End: 2023-03-08 | Stop reason: HOSPADM

## 2023-03-08 RX ORDER — OXYTOCIN/0.9 % SODIUM CHLORIDE 30/500 ML
340 PLASTIC BAG, INJECTION (ML) INTRAVENOUS CONTINUOUS PRN
Status: DISCONTINUED | OUTPATIENT
Start: 2023-03-08 | End: 2023-03-08 | Stop reason: HOSPADM

## 2023-03-08 RX ORDER — METHYLERGONOVINE MALEATE 0.2 MG/ML
200 INJECTION INTRAVENOUS
Status: DISCONTINUED | OUTPATIENT
Start: 2023-03-08 | End: 2023-03-08 | Stop reason: HOSPADM

## 2023-03-08 RX ADMIN — ACETAMINOPHEN 975 MG: 325 TABLET, FILM COATED ORAL at 23:50

## 2023-03-08 RX ADMIN — BUPIVACAINE HYDROCHLORIDE 30 ML: 2.5 INJECTION, SOLUTION EPIDURAL; INFILTRATION; INTRACAUDAL at 13:57

## 2023-03-08 RX ADMIN — Medication 300 ML/HR: at 13:07

## 2023-03-08 RX ADMIN — TRANEXAMIC ACID 1 G: 1 INJECTION, SOLUTION INTRAVENOUS at 12:42

## 2023-03-08 RX ADMIN — Medication 100 ML/HR: at 15:40

## 2023-03-08 RX ADMIN — MORPHINE SULFATE 0.15 MG: 0.5 INJECTION, SOLUTION EPIDURAL; INTRATHECAL; INTRAVENOUS at 12:40

## 2023-03-08 RX ADMIN — ACETAMINOPHEN 975 MG: 325 TABLET, FILM COATED ORAL at 18:11

## 2023-03-08 RX ADMIN — DEXAMETHASONE SODIUM PHOSPHATE 4 MG: 4 INJECTION, SOLUTION INTRA-ARTICULAR; INTRALESIONAL; INTRAMUSCULAR; INTRAVENOUS; SOFT TISSUE at 13:09

## 2023-03-08 RX ADMIN — SODIUM CHLORIDE, POTASSIUM CHLORIDE, SODIUM LACTATE AND CALCIUM CHLORIDE: 600; 310; 30; 20 INJECTION, SOLUTION INTRAVENOUS at 12:53

## 2023-03-08 RX ADMIN — PHENYLEPHRINE HYDROCHLORIDE 0.5 MCG/KG/MIN: 10 INJECTION INTRAVENOUS at 12:42

## 2023-03-08 RX ADMIN — BUPIVACAINE 20 ML: 13.3 INJECTION, SUSPENSION, LIPOSOMAL INFILTRATION at 13:57

## 2023-03-08 RX ADMIN — SODIUM CHLORIDE, POTASSIUM CHLORIDE, SODIUM LACTATE AND CALCIUM CHLORIDE: 600; 310; 30; 20 INJECTION, SOLUTION INTRAVENOUS at 11:07

## 2023-03-08 RX ADMIN — BUPIVACAINE HYDROCHLORIDE IN DEXTROSE 1.5 ML: 7.5 INJECTION, SOLUTION SUBARACHNOID at 12:46

## 2023-03-08 RX ADMIN — ONDANSETRON 4 MG: 2 INJECTION INTRAMUSCULAR; INTRAVENOUS at 12:39

## 2023-03-08 RX ADMIN — ACETAMINOPHEN 975 MG: 325 TABLET ORAL at 12:11

## 2023-03-08 RX ADMIN — PHENYLEPHRINE HYDROCHLORIDE 200 MCG: 10 INJECTION INTRAVENOUS at 12:45

## 2023-03-08 RX ADMIN — CEFAZOLIN SODIUM 2 G: 2 INJECTION, SOLUTION INTRAVENOUS at 12:38

## 2023-03-08 RX ADMIN — SODIUM CITRATE AND CITRIC ACID MONOHYDRATE 30 ML: 500; 334 SOLUTION ORAL at 12:11

## 2023-03-08 RX ADMIN — KETOROLAC TROMETHAMINE 30 MG: 30 INJECTION, SOLUTION INTRAMUSCULAR; INTRAVENOUS at 20:45

## 2023-03-08 RX ADMIN — SENNOSIDES AND DOCUSATE SODIUM 1 TABLET: 50; 8.6 TABLET ORAL at 20:45

## 2023-03-08 ASSESSMENT — ACTIVITIES OF DAILY LIVING (ADL)
DIFFICULTY_EATING/SWALLOWING: NO
ADLS_ACUITY_SCORE: 18
ADLS_ACUITY_SCORE: 18
WEAR_GLASSES_OR_BLIND: NO
CONCENTRATING,_REMEMBERING_OR_MAKING_DECISIONS_DIFFICULTY: NO
FALL_HISTORY_WITHIN_LAST_SIX_MONTHS: NO
CHANGE_IN_FUNCTIONAL_STATUS_SINCE_ONSET_OF_CURRENT_ILLNESS/INJURY: NO
ADLS_ACUITY_SCORE: 18
TOILETING_ISSUES: NO
DRESSING/BATHING_DIFFICULTY: NO
ADLS_ACUITY_SCORE: 18
ADLS_ACUITY_SCORE: 35
WALKING_OR_CLIMBING_STAIRS_DIFFICULTY: NO
DOING_ERRANDS_INDEPENDENTLY_DIFFICULTY: NO
ADLS_ACUITY_SCORE: 18
ADLS_ACUITY_SCORE: 18

## 2023-03-08 NOTE — ANESTHESIA CARE TRANSFER NOTE
Patient: Carmela Elizalde    Procedure: Procedure(s):  REPEAT  SECTION AND BILATERAL TUBAL LIGATION       Diagnosis: Previous  section [Z98.891]  Encounter for sterilization [Z30.2]  Diagnosis Additional Information: No value filed.    Anesthesia Type:   Spinal     Note:    Oropharynx: oropharynx clear of all foreign objects  Level of Consciousness: awake  Oxygen Supplementation: room air      Dentition: dentition unchanged  Vital Signs Stable: post-procedure vital signs reviewed and stable  Report to RN Given: handoff report given  Patient transferred to: Labor and Delivery    Handoff Report: Identifed the Patient, Identified the Reponsible Provider, Reviewed the pertinent medical history, Discussed the surgical course, Reviewed Intra-OP anesthesia mangement and issues during anesthesia, Set expectations for post-procedure period and Allowed opportunity for questions and acknowledgement of understanding      Vitals:  Vitals Value Taken Time   /64 23 1413   Temp 36.4  C (97.6  F) 23 1413   Pulse 71 23 1413   Resp 14 23 1413   SpO2 99 % 23 1413       Electronically Signed By: ELLIOT Martin CRNA  2023  2:14 PM

## 2023-03-08 NOTE — H&P
OB Admission H&P     Date: 3/8/2023  Time: 12:21 PM   Carmela Elizalde, : 2000, MRN: [id number]     CC: Repeat  Section and Bilateral Tubal Ligation    HPI: Carmela Elizalde is a 22 year old  with  single inter-uterine gestation at 39w3d, with FARHEEN of Estimated Date of Delivery: 2023. She presented to L&D for scheduled repeat  section.  Pregnancy has been complicated by 3 prior  sections, trichomonas and chlamydia. Patient denies headache, visual changes, RUQ pain. She denies contractions, leakage of fluid, or vaginal bleeding. Reports good fetal movement. Patient has completed her childbearing and wishes to proceed with permanent sterilization.     OB History   OB History    Para Term  AB Living   4 3 3 0 0 3   SAB IAB Ectopic Multiple Live Births   0 0 0 0 0      # Outcome Date GA Lbr Ryan/2nd Weight Sex Delivery Anes PTL Lv   4 Current            3 Term 18     CS-LTranv      2 Term 17     CS-LTranv      1 Term 16     CS-LTranv          Past Medical History:  Past Medical History:   Diagnosis Date     Kidney stone         Past Surgical History:   Past Surgical History:   Procedure Laterality Date     AS FRAGMENTING OF KIDNEY STONE          Social History   Reviewed, patient denies smoking, alcohol and drug use      Medications  Current Facility-Administered Medications   Medication     bupivacaine liposome (EXPAREL) 1.3 % LA inj susp 20 mL     carboprost (HEMABATE) injection 250 mcg     ceFAZolin (ANCEF) 2 g in 100 mL D5W intermittent infusion     ceFAZolin (ANCEF) 2 g in 100 mL D5W intermittent infusion     fentaNYL (PF) (SUBLIMAZE) injection 100 mcg     fentaNYL (PF) (SUBLIMAZE) injection 100 mcg     lactated ringers BOLUS 250 mL     lactated ringers infusion     lactated ringers infusion     lidocaine (LMX4) cream     lidocaine (LMX4) cream     lidocaine 1 % 0.1-1 mL     lidocaine 1 % 0.1-1 mL     medication instruction      methylergonovine (METHERGINE) injection 200 mcg     midazolam (VERSED) injection 2 mg     misoprostol (CYTOTEC) tablet 400 mcg    Or     misoprostol (CYTOTEC) tablet 800 mcg     nalbuphine (NUBAIN) injection 2.5-5 mg     naloxone (NARCAN) injection 0.2 mg    Or     naloxone (NARCAN) injection 0.4 mg    Or     naloxone (NARCAN) injection 0.2 mg    Or     naloxone (NARCAN) injection 0.4 mg     oxytocin (PITOCIN) 30 units in 500 mL 0.9% NaCl infusion     oxytocin (PITOCIN) injection 10 Units     phenylephrine (CHUCHO-SYNEPHRINE) injection 100 mcg     sodium chloride (PF) 0.9% PF flush 3 mL     sodium chloride (PF) 0.9% PF flush 3 mL     sodium chloride (PF) 0.9% PF flush 3 mL     sodium chloride (PF) 0.9% PF flush 3 mL     tranexamic acid (CYKLOKAPRON) bolus 1 g vial attach to NaCl 50 or 100 mL bag ADULT       Allergies   No Known Allergies    ROS: otherwise negative except what is stated in HPI.     Physical Exam:   Vitals pending - /72 (BP Location: Left arm)   Temp 98.3  F (36.8  C) (Oral)   Resp 16   Ht 1.524 m (5')   Wt 72.6 kg (160 lb)   SpO2 100%   BMI 31.25 kg/m     Gen: no acute distress   Heart: regular rate and rhythm   Pulm: clear to ausculation bilaterally    Abd: gravid, soft, nontender   Extremities: soft, nontender   FHR: positive, catagory 1  [place]: Q 2-4 minutes      Impression:   single inter uterine pregnancy at term   prior  Section X3  desires permanent sterilization, has completed childbrearing    Plan:   Discussed risks and benefits of  section and tubal ligation. Risks include, but are not limited to, bleeding, infection, damage to bowel, bladder, ureters, or any other surrounding abdominal/pelvic organs, including infant. Risk of failure of tubal ligation discussed. Wishes to proceed to the OR for delivery and tubal ligation.     Chepe Cole MD

## 2023-03-08 NOTE — ANESTHESIA PROCEDURE NOTES
TAP Procedure Note    Pre-Procedure   Staff -        Anesthesiologist:  Antonio Paez MD       Performed By: anesthesiologist       Location: pre-op       Procedure Start/Stop Times: 3/8/2023 1:50 PM and 3/8/2023 1:55 PM       Pre-Anesthestic Checklist: patient identified, IV checked, site marked, risks and benefits discussed, informed consent, monitors and equipment checked, pre-op evaluation, at physician/surgeon's request and post-op pain management  Timeout:       Correct Patient: Yes        Correct Procedure: Yes        Correct Site: Yes        Correct Position: Yes        Correct Laterality: Yes        Site Marked: Yes  Procedure Documentation  Procedure: TAP       Laterality: bilateral       Patient Position: supine       Skin prep: Chloraprep       Local skin infiltrated with 3 mL of 1% lidocaine.        Needle Type: other       Needle Gauge: 20.        Needle Length (Inches): 6        Ultrasound guided       1. Ultrasound was used to identify targeted nerve, plexus, vascular marker, or fascial plane and place a needle adjacent to it in real-time.       2. Ultrasound was used to visualize the spread of anesthetic in close proximity to the above referenced structure.       3. A permanent image is entered into the patient's record.       4. The visualized anatomic structures appeared normal.       5. There were no apparent abnormal pathologic findings.    Assessment/Narrative         The placement was negative for: blood aspirated, painful injection and site bleeding       Paresthesias: No.       Test dose of 3 mL at.         Test dose negative, 3 minutes after injection, for signs of intravascular, subdural, or intrathecal injection.       Bolus given via needle. no blood aspirated via catheter.        Secured via.        Insertion/Infusion Method: Single Shot       Complications: none       Injection made incrementally with aspirations every 5 mL.    Medication(s) Administered   Medication  "Administration Time: 3/8/2023 1:50 PM      FOR [facility] ([facility]) ONLY:   Pain Team Contact information: please page the Pain Team Via [employer] Search \"Pain\". During daytime hours, please page the attending first. At night please page the resident first.    "

## 2023-03-08 NOTE — ANESTHESIA PROCEDURE NOTES
"Intrathecal injection Procedure Note    Pre-Procedure   Staff -        Anesthesiologist:  Antonio Paez MD       Performed By: anesthesiologist       Location: OR       Procedure Start/Stop Times: 3/8/2023 12:45 PM and 3/8/2023 12:47 PM       Pre-Anesthestic Checklist: patient identified, IV checked, risks and benefits discussed, informed consent, monitors and equipment checked, pre-op evaluation, at physician/surgeon's request and post-op pain management  Timeout:       Correct Patient: Yes        Correct Procedure: Yes        Correct Site: Yes        Correct Position: Yes   Procedure Documentation  Procedure: intrathecal injection       Patient Position: sitting       Skin prep: Chloraprep       Insertion Site: L4-5. (right paramedian approach).       Needle Gauge: 24.        Needle Length (Inches): 3.5        Spinal Needle Type: Pencan       Introducer used       # of attempts: 1 and  # of redirects:     Assessment/Narrative         Paresthesias: No.       CSF fluid: clear.    Medication(s) Administered   Medication Administration Time: 3/8/2023 12:45 PM      FOR [facility] ([facility]) ONLY:   Pain Team Contact information: please page the Pain Team Via [employer]. Search \"Pain\". During daytime hours, please page the attending first. At night please page the resident first.    "

## 2023-03-08 NOTE — OP NOTE
Section Operative Note      Pre-operative Diagnosis: 1.  Intrauterine pregnancy 39 week 3 days gestation  2.  Three prior  sections  3.  Desires permanent sterilization    Post-operative Diagnosis: same, delivered    Procedure:  1. Repeat low transverse  section                        2. Bilateral salpingectomy    Surgeon:  Chepe Cole MD    Anesthesia:  Spinal    Estimated Blood Loss:  502 mL    Complications:  None; patient tolerated the procedure well.    Specimens: Bilateral fallopian tubes    Indications for surgery:  The patient is a 22 year old  at 39w3d who presents for repeat  section and bilateral salpingectomy. Patient's history is significant for 3 prior  sections. Patient has completed her childbearing and desires permanent sterilization.  The risks and benefits were discussed and consent obtained to proceed with repeat  section and bilateral salpingectomy.    Findings:  Normal uterus, fallopian tubes and ovaries. Viable male infant with Apgars of 8 and 9. Weight not available at the time of this note.     Procedure Details   The patient was taken to the Operating Room, identified as Carmela Elizalde and the procedure verified as  Delivery and Bilateral Tubal Ligation. A Time Out was held and the above information confirmed.    After administration of spinal anesthesia, the patient was positioned in the left lateral tilt position and was prepped and draped in the usual sterile fashion. A Pfannenstiel incision was made and carried down sharply through the subcutaneous tissue.  The fascia was incised horizontally, and the rectus abdominis muscles bluntly and sharply dissected away from the fascia superiorly and inferiorly to the pubic symphysis.  The rectus muscles were  in the midline, and the peritoneum was identified.  A vertical peritoneal incision was made and extended superiorly and inferiorly to the upper edge of the  bladder.  The vesicouterine peritoneal reflection was incised transversely and the bladder flap was bluntly dissected away from the lower uterine segment.     A horizontal incision was made in the lower uterine segment, and the incision was extended bilaterally in a blunt fashion.  Membranes were ruptured and the amnoitic fluid was  clear.  The infant was delivered from a LOT presentation.  There was no nuchal cord.  Mouth and nares were bulb suctioned and cord doubly clamped and cut.  The infant was passed off to the Pediatric team in attendance with findings as noted above.      The placenta was delivered spontaneously, intact, with a 3 vessel cord.  The uterine cavity was wiped free of remaining clot and membrane.  The uterus was exteriorized and the cut edges of the uterine incision were grasped.  There was a midline extension of the uterine incision, repaired with 0 vicryl in a running locked fashion.  The uterus was then closed with a single layer of continuous running locked 0 vicryl.  A second imbricating layer was completed with 0 vicryl. The uterine incision was inspected and all was hemostatic.  The Fallopian tubes and ovaries were examined and appeared normal.  The bladder flap was inspected and was hemostatic.  The bladder flap was reapproximated with running 3.0 vicryl.  The right fallopian tube was grasped with [place] clamps. The mesosalpinx was ligated with the LigaSure. The right fallopian tube was then transected at the fundus and removed. Attention was then turned to the left fallopian tube.The left fallopian tube was grasped with [place] clamps. The mesosalpinx was ligated with the LigaSure. The left fallopian tube was then transected at the fundus and removed. Both fallopian tubes were sent to Pathology. The peritoneal cavity was irrigated. The uterus was replaced into the abdominal cavity. The uterine incision was once again inspected once back in its normal anatomic position and was  hemostatic.  The parietal peritoneum was closed with continuous running 3.0 vicryl.  The subfascial space was inspected and hemostasis was noted  The fascia was closed with continuous running 0 vicryl.  The subcutaneous tissue was irrigated and hemostasis was noted.  Skin edges reapproximated with subcuticular 3.0 vicryl.  The incision was dressed.    Sponge and needle counts were correct.  There were no complications.  The patient tolerated the procedure well and was transferred to her recovery room in good condition.      Chepe Cole MD

## 2023-03-08 NOTE — ANESTHESIA PREPROCEDURE EVALUATION
Anesthesia Pre-Procedure Evaluation    Patient: Carmela Elizalde   MRN: [id number] : 2000        Procedure : Procedure(s):  REPEAT  SECTION AND BILATERAL TUBAL LIGATION          Past Medical History:   Diagnosis Date     Kidney stone       Past Surgical History:   Procedure Laterality Date     AS FRAGMENTING OF KIDNEY STONE        No Known Allergies   Social History     Tobacco Use     Smoking status: Never     Smokeless tobacco: Never   Substance Use Topics     Alcohol use: Not Currently      Wt Readings from Last 1 Encounters:   23 70.3 kg (155 lb)        Anesthesia Evaluation            ROS/MED HX  ENT/Pulmonary:  - neg pulmonary ROS     Neurologic:  - neg neurologic ROS     Cardiovascular:  - neg cardiovascular ROS     METS/Exercise Tolerance: >4 METS    Hematologic:  - neg hematologic  ROS     Musculoskeletal:  - neg musculoskeletal ROS     GI/Hepatic:  - neg GI/hepatic ROS     Renal/Genitourinary:       Endo:  - neg endo ROS     Psychiatric/Substance Use:  - neg psychiatric ROS     Infectious Disease:  - neg infectious disease ROS     Malignancy:  - neg malignancy ROS     Other:  - neg other ROS    (+) Possibly pregnant, ,         Physical Exam    Airway  airway exam normal      Mallampati: II       Respiratory Devices and Support         Dental           Cardiovascular   cardiovascular exam normal          Pulmonary   pulmonary exam normal                OUTSIDE LABS:  CBC:   Lab Results   Component Value Date    WBC 10.4 2022    WBC 13.6 (H) 08/15/2022    HGB 9.3 (L) 2022    HGB 10.1 (L) 08/15/2022    HCT 29.2 (L) 2022    HCT 31.8 (L) 08/15/2022     2022     08/15/2022     BMP:   Lab Results   Component Value Date     2022     08/15/2022    POTASSIUM 3.8 2022    POTASSIUM 3.8 08/15/2022    CHLORIDE 106 2022    CHLORIDE 105 08/15/2022    CO2 21 (L) 2022    CO2 24 08/15/2022    BUN 3 (L) 2022    BUN 10  08/15/2022    CR 0.57 (L) 12/24/2022    CR 0.63 08/15/2022    GLC 91 12/24/2022    GLC 88 08/15/2022     COAGS: No results found for: PTT, INR, FIBR  POC: No results found for: BGM, HCG, HCGS  HEPATIC:   Lab Results   Component Value Date    ALBUMIN 3.1 (L) 12/24/2022    PROTTOTAL 6.8 12/24/2022    ALT 17 12/24/2022    AST 20 12/24/2022    ALKPHOS 119 12/24/2022    BILITOTAL <0.1 12/24/2022     OTHER:   Lab Results   Component Value Date    ERIKA 8.7 12/24/2022    MAG 1.8 12/24/2022    LIPASE 17 12/24/2022       Anesthesia Plan    ASA Status:  2      Anesthesia Type: Spinal.              Consents    Anesthesia Plan(s) and associated risks, benefits, and realistic alternatives discussed. Questions answered and patient/representative(s) expressed understanding.    - Discussed:     - Discussed with:  Patient         Postoperative Care    Pain management: Peripheral nerve block (Single Shot).        Comments:                Antonio Paez MD

## 2023-03-08 NOTE — ANESTHESIA POSTPROCEDURE EVALUATION
Patient: Carmela Elizalde    Procedure: Procedure(s):  REPEAT  SECTION AND BILATERAL TUBAL LIGATION       Anesthesia Type:  Spinal    Note:  Disposition: Outpatient   Postop Pain Control: Uneventful            Sign Out: Well controlled pain   PONV: No   Neuro/Psych: Uneventful            Sign Out: Acceptable/Baseline neuro status   Airway/Respiratory: Uneventful            Sign Out: Acceptable/Baseline resp. status   CV/Hemodynamics: Uneventful            Sign Out: Acceptable CV status; No obvious hypovolemia; No obvious fluid overload   Other NRE: NONE   DID A NON-ROUTINE EVENT OCCUR? No           Last vitals:  Vitals:    23 1434 23 1443 23 1444   BP:  134/80    Pulse:      Resp:      Temp:      SpO2: 99%  98%       Electronically Signed By: Antonio Paez MD  2023  2:57 PM

## 2023-03-09 LAB — HGB BLD-MCNC: 8.1 G/DL (ref 11.7–15.7)

## 2023-03-09 PROCEDURE — 250N000013 HC RX MED GY IP 250 OP 250 PS 637: Performed by: ADVANCED PRACTICE MIDWIFE

## 2023-03-09 PROCEDURE — 250N000011 HC RX IP 250 OP 636: Performed by: OBSTETRICS & GYNECOLOGY

## 2023-03-09 PROCEDURE — 250N000011 HC RX IP 250 OP 636: Performed by: ADVANCED PRACTICE MIDWIFE

## 2023-03-09 PROCEDURE — 36415 COLL VENOUS BLD VENIPUNCTURE: CPT | Performed by: OBSTETRICS & GYNECOLOGY

## 2023-03-09 PROCEDURE — 258N000003 HC RX IP 258 OP 636: Performed by: ADVANCED PRACTICE MIDWIFE

## 2023-03-09 PROCEDURE — 85018 HEMOGLOBIN: CPT | Performed by: OBSTETRICS & GYNECOLOGY

## 2023-03-09 PROCEDURE — 250N000013 HC RX MED GY IP 250 OP 250 PS 637: Performed by: OBSTETRICS & GYNECOLOGY

## 2023-03-09 PROCEDURE — 120N000001 HC R&B MED SURG/OB

## 2023-03-09 RX ORDER — FERROUS SULFATE 325(65) MG
325 TABLET ORAL DAILY
Status: DISCONTINUED | OUTPATIENT
Start: 2023-03-09 | End: 2023-03-10 | Stop reason: HOSPADM

## 2023-03-09 RX ORDER — DIPHENHYDRAMINE HYDROCHLORIDE 50 MG/ML
50 INJECTION INTRAMUSCULAR; INTRAVENOUS
Status: DISCONTINUED | OUTPATIENT
Start: 2023-03-09 | End: 2023-03-10 | Stop reason: HOSPADM

## 2023-03-09 RX ORDER — METHYLPREDNISOLONE SODIUM SUCCINATE 125 MG/2ML
125 INJECTION, POWDER, LYOPHILIZED, FOR SOLUTION INTRAMUSCULAR; INTRAVENOUS
Status: DISCONTINUED | OUTPATIENT
Start: 2023-03-09 | End: 2023-03-10 | Stop reason: HOSPADM

## 2023-03-09 RX ADMIN — OXYCODONE HYDROCHLORIDE 5 MG: 5 TABLET ORAL at 22:14

## 2023-03-09 RX ADMIN — SENNOSIDES AND DOCUSATE SODIUM 2 TABLET: 50; 8.6 TABLET ORAL at 21:33

## 2023-03-09 RX ADMIN — SENNOSIDES AND DOCUSATE SODIUM 2 TABLET: 50; 8.6 TABLET ORAL at 08:56

## 2023-03-09 RX ADMIN — KETOROLAC TROMETHAMINE 30 MG: 30 INJECTION, SOLUTION INTRAMUSCULAR; INTRAVENOUS at 02:15

## 2023-03-09 RX ADMIN — KETOROLAC TROMETHAMINE 30 MG: 30 INJECTION, SOLUTION INTRAMUSCULAR; INTRAVENOUS at 08:57

## 2023-03-09 RX ADMIN — ACETAMINOPHEN 975 MG: 325 TABLET, FILM COATED ORAL at 12:50

## 2023-03-09 RX ADMIN — ACETAMINOPHEN 975 MG: 325 TABLET, FILM COATED ORAL at 18:37

## 2023-03-09 RX ADMIN — ACETAMINOPHEN 975 MG: 325 TABLET, FILM COATED ORAL at 05:37

## 2023-03-09 RX ADMIN — OXYCODONE HYDROCHLORIDE 5 MG: 5 TABLET ORAL at 16:50

## 2023-03-09 RX ADMIN — IRON SUCROSE 200 MG: 20 INJECTION, SOLUTION INTRAVENOUS at 12:34

## 2023-03-09 RX ADMIN — IBUPROFEN 800 MG: 800 TABLET ORAL at 14:49

## 2023-03-09 RX ADMIN — IBUPROFEN 800 MG: 800 TABLET ORAL at 21:33

## 2023-03-09 RX ADMIN — FERROUS SULFATE TAB 325 MG (65 MG ELEMENTAL FE) 325 MG: 325 (65 FE) TAB at 11:04

## 2023-03-09 ASSESSMENT — ACTIVITIES OF DAILY LIVING (ADL)
ADLS_ACUITY_SCORE: 18

## 2023-03-09 NOTE — PLAN OF CARE
Problem: Plan of Care - These are the overarching goals to be used throughout the patient stay.    Goal: Optimal Comfort and Wellbeing  Outcome: Progressing     Problem: Plan of Care - These are the overarching goals to be used throughout the patient stay.    Goal: Absence of Hospital-Acquired Illness or Injury  Outcome: Progressing     Problem: Postpartum ( Delivery)  Goal: Fluid and Electrolyte Balance  Outcome: Progressing

## 2023-03-09 NOTE — PROGRESS NOTES
Postpartum Day 1    Patient Name:  Carmela Elizalde  :  2000  MRN:  [id number]      Assessment:  Normal postpartum course. Chronic upon acute blood loss anemia.    Plan:  Continue current care. IV Iron today and tomorrow.    Subjective:  The patient feels well:  Voiding without difficulty, lochia normal, tolerating normal diet, ambulating without difficulty and passing flatus.  Voiding independently without complication. Pain is well controlled with current medications.  The patient has no emotional concerns.  The baby is well and being fed by breast.    YOB: 2025   Birth Time: 1:06 PM     Prenatal complications:Hx CS x3, trich and chlamydia pos.    Objective:  BP 94/59 (BP Location: Right arm, Patient Position: Semi-Cannon's, Cuff Size: Adult Regular)   Pulse 72   Temp 98.1  F (36.7  C) (Oral)   Resp 16   Ht 1.524 m (5')   Wt 72.6 kg (160 lb)   SpO2 97%   Breastfeeding Unknown   BMI 31.25 kg/m    Patient Vitals for the past 24 hrs:   BP Temp Temp src Pulse Resp SpO2 Height Weight   23 0900 94/59 98.1  F (36.7  C) Oral 72 16 97 % -- --   23 0815 96/51 98.5  F (36.9  C) Oral 67 17 96 % -- --   23 0405 107/51 98.3  F (36.8  C) Oral 77 18 96 % -- --   23 0000 132/58 97.7  F (36.5  C) Oral 83 16 97 % -- --   23 2030 111/55 97.9  F (36.6  C) Oral 78 16 98 % -- --   23 1930 119/54 97.7  F (36.5  C) Axillary 69 16 97 % -- --   23 1815 126/68 97.9  F (36.6  C) Oral -- 16 96 % -- --   23 1715 133/67 97.9  F (36.6  C) Oral 67 14 -- -- --   23 1650 131/71 -- -- -- 16 -- -- --   23 1613 125/73 -- -- -- -- -- -- --   23 1559 136/65 97.6  F (36.4  C) Oral -- 16 99 % -- --   23 1543 129/61 -- -- -- -- -- -- --   23 1529 130/58 97.7  F (36.5  C) Oral -- 16 95 % -- --   23 1524 -- -- -- -- -- 100 % -- --   23 1513 113/69 -- -- -- -- -- -- --   23 1509 -- -- -- -- -- 99 % -- --   23 1504 -- -- --  -- -- 97 % -- --   03/08/23 1458 129/79 -- -- -- -- -- -- --   03/08/23 1454 -- -- -- -- -- 99 % -- --   03/08/23 1444 -- -- -- -- -- 98 % -- --   03/08/23 1443 134/80 -- -- -- -- -- -- --   03/08/23 1434 -- -- -- -- -- 99 % -- --   03/08/23 1429 -- -- -- -- -- 99 % -- --   03/08/23 1428 125/67 -- -- -- -- 99 % -- --   03/08/23 1424 -- -- -- -- -- 98 % -- --   03/08/23 1413 121/64 97.6  F (36.4  C) Oral 71 14 99 % -- --   03/08/23 1412 -- 97.6  F (36.4  C) Oral 71 14 99 % -- --   03/08/23 1116 -- -- -- -- -- 100 % -- --   03/08/23 1111 -- -- -- -- -- 100 % -- --   03/08/23 1106 -- -- -- -- -- 100 % -- --   03/08/23 1101 -- -- -- -- -- 100 % -- --   03/08/23 1056 -- -- -- -- -- 100 % -- --   03/08/23 1051 -- -- -- -- -- 100 % -- --   03/08/23 1046 -- -- -- -- -- 91 % -- --   03/08/23 1045 -- -- -- -- -- 97 % -- --   03/08/23 1040 -- -- -- -- -- 100 % -- --   03/08/23 1038 123/72 98.3  F (36.8  C) Oral -- 16 100 % 1.524 m (5') 72.6 kg (160 lb)       Exam: Patient A&O x 3. No acute distress, breathing unlabored. The amount and color of the lochia is appropriate for the duration of recovery. Uterine fundus is firm at U-1. Urinary output is adequate. The low transverse surgical dressing is clean, dry and intact.    Lab Results   Component Value Date    AS Negative 03/08/2023    HGB 8.1 (L) 03/09/2023       There is no immunization history for the selected administration types on file for this patient.    Provider: ELLIOT Barrett CNM    Date:  3/9/2023  Time:  10:03 AM

## 2023-03-10 VITALS
OXYGEN SATURATION: 100 % | BODY MASS INDEX: 31.41 KG/M2 | HEART RATE: 77 BPM | WEIGHT: 160 LBS | TEMPERATURE: 97.8 F | SYSTOLIC BLOOD PRESSURE: 116 MMHG | HEIGHT: 60 IN | RESPIRATION RATE: 16 BRPM | DIASTOLIC BLOOD PRESSURE: 68 MMHG

## 2023-03-10 LAB
PATH REPORT.COMMENTS IMP SPEC: NORMAL
PATH REPORT.COMMENTS IMP SPEC: NORMAL
PATH REPORT.FINAL DX SPEC: NORMAL
PATH REPORT.GROSS SPEC: NORMAL
PATH REPORT.MICROSCOPIC SPEC OTHER STN: NORMAL
PATH REPORT.RELEVANT HX SPEC: NORMAL
PHOTO IMAGE: NORMAL

## 2023-03-10 PROCEDURE — 250N000013 HC RX MED GY IP 250 OP 250 PS 637: Performed by: ADVANCED PRACTICE MIDWIFE

## 2023-03-10 PROCEDURE — 250N000013 HC RX MED GY IP 250 OP 250 PS 637: Performed by: OBSTETRICS & GYNECOLOGY

## 2023-03-10 RX ORDER — OXYCODONE HYDROCHLORIDE 5 MG/1
5 TABLET ORAL EVERY 4 HOURS PRN
Qty: 10 TABLET | Refills: 0 | Status: SHIPPED | OUTPATIENT
Start: 2023-03-10

## 2023-03-10 RX ORDER — IBUPROFEN 800 MG/1
800 TABLET, FILM COATED ORAL EVERY 6 HOURS
COMMUNITY
Start: 2023-03-10

## 2023-03-10 RX ORDER — ACETAMINOPHEN 325 MG/1
975 TABLET ORAL EVERY 6 HOURS
COMMUNITY
Start: 2023-03-10

## 2023-03-10 RX ORDER — FERROUS SULFATE 325(65) MG
325 TABLET ORAL DAILY
Qty: 30 TABLET | Refills: 1 | Status: SHIPPED | OUTPATIENT
Start: 2023-03-10

## 2023-03-10 RX ADMIN — IBUPROFEN 800 MG: 800 TABLET ORAL at 09:47

## 2023-03-10 RX ADMIN — OXYCODONE HYDROCHLORIDE 5 MG: 5 TABLET ORAL at 04:25

## 2023-03-10 RX ADMIN — ACETAMINOPHEN 975 MG: 325 TABLET, FILM COATED ORAL at 08:33

## 2023-03-10 RX ADMIN — IBUPROFEN 800 MG: 800 TABLET ORAL at 03:11

## 2023-03-10 RX ADMIN — FERROUS SULFATE TAB 325 MG (65 MG ELEMENTAL FE) 325 MG: 325 (65 FE) TAB at 09:47

## 2023-03-10 RX ADMIN — ACETAMINOPHEN 975 MG: 325 TABLET, FILM COATED ORAL at 00:50

## 2023-03-10 RX ADMIN — SENNOSIDES AND DOCUSATE SODIUM 1 TABLET: 50; 8.6 TABLET ORAL at 09:47

## 2023-03-10 ASSESSMENT — ACTIVITIES OF DAILY LIVING (ADL)
ADLS_ACUITY_SCORE: 18

## 2023-03-10 NOTE — PLAN OF CARE
Problem: Postpartum ( Delivery)  Goal: Optimal Pain Control and Function  Outcome: Progressing  Intervention: Prevent or Manage Pain  Recent Flowsheet Documentation  Taken 3/10/2023 0510 by Anne Cantu RN  Pain Management Interventions:   repositioned   rest  Taken 3/10/2023 0356 by Anne Cantu RN  Pain Management Interventions: repositioned  Taken 3/10/2023 0135 by Anne Cantu RN  Pain Management Interventions:   repositioned   rest  Taken 3/10/2023 0051 by Anne Cantu RN  Pain Management Interventions: medication (see MAR)   Delivery of infant boy at 1306 on 3/8. Patient is up ambulating in the room independently. Patient is voiding without difficulty. Patient's pain is controled with scheduled tylenol, ibuprofen and PRN oxycodone. Feeding infant via breast and donor milk. Blood pressures  are within normal range. Will continue with plan of care.     Other pertinent information from the shift: Patient's nipples became sore, hydrogels provided.

## 2023-03-10 NOTE — PLAN OF CARE
Problem: Plan of Care - These are the overarching goals to be used throughout the patient stay.    Goal: Optimal Comfort and Wellbeing  Outcome: Progressing  Intervention: Monitor Pain and Promote Comfort  Recent Flowsheet Documentation  Taken 3/9/2023 1840 by Anita Maldonado RN  Pain Management Interventions: medication (see MAR)  Taken 3/9/2023 1650 by Anita Maldonado RN  Pain Management Interventions: medication (see MAR)  Taken 3/9/2023 1449 by Anita Maldonado RN  Pain Management Interventions: medication (see MAR)  Taken 3/9/2023 1250 by Anita Maldonado RN  Pain Management Interventions: medication (see MAR)  Taken 3/9/2023 0857 by Anita Maldonado RN  Pain Management Interventions: medication (see MAR)  Intervention: Provide Person-Centered Care  Recent Flowsheet Documentation  Taken 3/9/2023 1300 by Anita Maldonado RN  Trust Relationship/Rapport:   care explained   choices provided   emotional support provided   empathic listening provided   questions answered   questions encouraged   reassurance provided   thoughts/feelings acknowledged  Taken 3/9/2023 0900 by Anita Maldonado RN  Trust Relationship/Rapport:   care explained   choices provided   emotional support provided   empathic listening provided   questions answered   questions encouraged   reassurance provided   thoughts/feelings acknowledged  Goal: Readiness for Transition of Care  Outcome: Progressing     Problem: Postpartum ( Delivery)  Goal: Effective Bowel Elimination  Outcome: Progressing  Goal: Fluid and Electrolyte Balance  Outcome: Progressing  Goal: Absence of Infection Signs and Symptoms  Outcome: Progressing  Goal: Optimal Pain Control and Function  Outcome: Progressing  Intervention: Prevent or Manage Pain  Recent Flowsheet Documentation  Taken 3/9/2023 1840 by Anita Maldonado RN  Pain Management Interventions: medication (see MAR)  Taken 3/9/2023 1650 by Anita Maldonado RN  Pain Management Interventions: medication (see  MAR)  Taken 3/9/2023 1449 by Anita Maldonado RN  Pain Management Interventions: medication (see MAR)  Taken 3/9/2023 1250 by Anita Maldonado RN  Pain Management Interventions: medication (see MAR)  Taken 3/9/2023 0857 by Anita Maldonado RN  Pain Management Interventions: medication (see MAR)     Problem: Postpartum ( Delivery)  Goal: Successful Maternal Role Transition  Outcome: Adequate for Care Transition  Goal: Hemostasis  Outcome: Adequate for Care Transition  Goal: Nausea and Vomiting Relief  Outcome: Adequate for Care Transition  Goal: Effective Urinary Elimination  Outcome: Adequate for Care Transition  Goal: Effective Oxygenation and Ventilation  Outcome: Adequate for Care Transition  Intervention: Optimize Oxygenation and Ventilation  Recent Flowsheet Documentation  Taken 3/9/2023 1300 by Anita Maldonado RN  Head of Bed (HOB) Positioning: HOB at 30 degrees  Taken 3/9/2023 0900 by Anita Maldonado RN  Head of Bed (HOB) Positioning: HOB at 30 degrees

## 2023-03-10 NOTE — LACTATION NOTE
This note was copied from a baby's chart.  Referred to Carmela who had her fourth baby. She reported that she has 3 pumps for home use. She brought a small rechargeable one for hospital use.    At time of consult, Carmela had baby at the L breast but in a poor position. He was repositioned for a better latch and for a more comfortable feed. Swallows were also pointed out to Carmela with breast compression.     The feeding was then to be continued on the R side. This nipple had a healing scab on it from a old latch. Carmela was shown how to express colostrum on her nipple and baby was placed in a football hold for a deeper latch. To offer baby PDM for a supplement as needed after feeding/pumping.    Resources for after discharge were discussed for ECFE and lactation.

## 2023-03-10 NOTE — PROGRESS NOTES
Postpartum Day 2    Patient Name:  Carmela Elizalde  :  2000  MRN:  [id number]      Assessment:  Normal postpartum course.    Plan:  Continue current care.  Discharge home.      Subjective:  The patient feels well:  Voiding without difficulty, lochia normal, tolerating normal diet, and passing flatus.  Pain is well controlled with current medications.  The patient has no emotional concerns.  The baby is well.    YOB: 2025     Birth Time: 1:06 PM     Prenatal Complications:   Hx CS x3, trich and chlamydia pos.    Objective:  /68 (BP Location: Left arm, Patient Position: Semi-Cannon's)   Pulse 77   Temp 97.8  F (36.6  C) (Oral)   Resp 16   Ht 1.524 m (5')   Wt 72.6 kg (160 lb)   SpO2 100%   Breastfeeding Unknown   BMI 31.25 kg/m    Patient Vitals for the past 24 hrs:   BP Temp Temp src Pulse Resp SpO2   03/10/23 0821 116/68 97.8  F (36.6  C) Oral -- 16 100 %   03/10/23 0242 125/71 98  F (36.7  C) Oral 77 18 --   23 2100 136/71 -- Oral 80 18 --   23 1300 133/70 97.9  F (36.6  C) Oral 74 18 98 %   23 1240 125/71 98.1  F (36.7  C) Oral 90 16 98 %       Exam: Patient A&O x 3. No acute distress, breathing unlabored. The amount and color of the lochia is appropriate for the duration of recovery. Uterine fundus is firm at U-2. Urinary output is adequate. The uncovered low transverse incision is healing well. There are steri strips across the incision. The middle 2 steristrips have old blood on them, incision looks well approximated. The patient is ambulating well without assistance.  The patient is tolerating a regular diet. She received IV iron and will go home on PO iron.    Lab Results   Component Value Date    AS Negative 2023    HGB 8.1 (L) 2023       There is no immunization history for the selected administration types on file for this patient.      Provider:  Spencer Klein CNM    Date:  3/10/2023  Time:  10:57 AM

## 2023-03-10 NOTE — DISCHARGE SUMMARY
OB Discharge Summary      Date:  3/10/2023    Name:  Carmela Elizalde  :  2000  MRN:  [id number]      Admission Date:  3/8/2023  Delivery Date: 3/8/2023   Gestational Age at Delivery:  40w1d  Discharge Date:  3/10/2023    Principal Diagnosis:    Patient Active Problem List   Diagnosis     Antepartum anemia     Depressive disorder     Marijuana use     Parent/child conflict     Recurrent kidney stones     Anemia complicating pregnancy in first trimester     Iron deficiency anemia, unspecified     Delivery of pregnancy by  section         Conditions complicating Pregnancy: Hx CS x3, trich and chlamydia pos.    Procedure(s) Performed:  Repeat CS    Indication for :  repeat  Indication for Induction:       Condition at Discharge:  stable    Discharge Medications:      Review of your medicines      START taking      Dose / Directions   acetaminophen 325 MG tablet  Commonly known as: TYLENOL  Used for: Postpartum care and examination of lactating mother      Dose: 975 mg  Take 3 tablets (975 mg) by mouth every 6 hours  Refills: 0     ferrous sulfate 325 (65 Fe) MG tablet  Commonly known as: FEROSUL  Used for: Postpartum care and examination of lactating mother      Dose: 325 mg  Take 1 tablet (325 mg) by mouth daily  Quantity: 30 tablet  Refills: 1     ibuprofen 800 MG tablet  Commonly known as: ADVIL/MOTRIN  Used for: Postpartum care and examination of lactating mother      Dose: 800 mg  Take 1 tablet (800 mg) by mouth every 6 hours  Refills: 0     oxyCODONE 5 MG tablet  Commonly known as: ROXICODONE  Used for: Postpartum care and examination of lactating mother      Dose: 5 mg  Take 1 tablet (5 mg) by mouth every 4 hours as needed for moderate to severe pain  Quantity: 10 tablet  Refills: 0        CONTINUE these medicines which have NOT CHANGED      Dose / Directions   prenatal multivitamin w/iron 27-0.8 MG tablet      Dose: 1 tablet  Take 1 tablet by mouth daily  Refills: 0           Where to  get your medicines      These medications were sent to [id number] - [place] [address] [place] [address] [place] [address]    Phone: [phone]     ferrous sulfate 325 (65 Fe) MG tablet     Some of these will need a paper prescription and others can be bought over the counter. Ask your nurse if you have questions.    Bring a paper prescription for each of these medications    oxyCODONE 5 MG tablet  You don't need a prescription for these medications    acetaminophen 325 MG tablet    ibuprofen 800 MG tablet          Discharge Plan:    Follow up with /LATISHA:  2 and 6 weeks   Patient Instructions:      Physical activity: As tolerated. Nothing in the vagina for 6 weeks.      Diet:  regular    Medication: see OTC med list, oxycodone for pain, oral iron for 6 weeks    Other:        Physician/CNM: Spencer Klein CNM    Name:  Carmela Elizalde  :  2000  MRN:  [id number]

## 2023-03-10 NOTE — PLAN OF CARE
Problem: Plan of Care - These are the overarching goals to be used throughout the patient stay.    Goal: Readiness for Transition of Care  Outcome: Met  Flowsheets (Taken 3/10/2023 1243)  Anticipated Changes Related to Illness: none  Transportation Anticipated: family or friend will provide  Concerns to be Addressed:   all concerns addressed in this encounter   no discharge needs identified  Barriers to Discharge: none    Discharge education reviewed with patient and she verbalizes understanding. No additional questions. ID band number matched with infant's. Oxycodone prescription given to patient prior to discharge. Discharged at 1230 via ambulation with infant in car seat.

## 2023-03-10 NOTE — PROGRESS NOTES
Outreach Note for JAMES Elizalde  [id number]  2000    Chart reviewed, discharge plan discussed with patient, needs assessed. Patient verbalizes understanding of plan, requests postpartum home care visit, nurse visit planned for Saturday, March 11th, Home Care Intake updated. Address and phone number reported as being correct in EMR.     Patient states she has good support at home, has baby care essentials, and feels ready to discharge today. Outreach RN will continue to follow and assist if needed with discharge plan. No additional needs identified at this time.

## 2023-03-14 ENCOUNTER — PATIENT OUTREACH (OUTPATIENT)
Dept: CARE COORDINATION | Facility: CLINIC | Age: 23
End: 2023-03-14
Payer: COMMERCIAL

## 2023-03-14 NOTE — PROGRESS NOTES
[facility] Contact  RUST/Voicemail     Clinical Data: Transitional Care Management Outreach     Outreach attempted x 2.  Left message on patient's voicemail, providing [facility]'s 24/7 scheduling and nurse triage phone number [phone] ([phone]) for questions/concerns and/or to schedule an appt with an [facility] provider, if they do not have a PCP.      Plan:  [facility] will do no further outreaches at this time.       Treva Martinez  Community Health Worker  [facility] [place], [facility]      *Connected Care Resource Team does NOT follow patient ongoing. Referrals are identified based on internal discharge reports and the outreach is to ensure patient has an understanding of their discharge instructions.

## 2024-05-14 ENCOUNTER — HOSPITAL ENCOUNTER (EMERGENCY)
Facility: CLINIC | Age: 24
Discharge: HOME | End: 2024-05-14
Attending: EMERGENCY MEDICINE | Admitting: EMERGENCY MEDICINE
Payer: COMMERCIAL

## 2024-05-14 ENCOUNTER — APPOINTMENT (OUTPATIENT)
Dept: CT IMAGING | Facility: CLINIC | Age: 24
End: 2024-05-14
Attending: EMERGENCY MEDICINE
Payer: COMMERCIAL

## 2024-05-14 VITALS
WEIGHT: 155 LBS | HEART RATE: 73 BPM | HEIGHT: 59 IN | DIASTOLIC BLOOD PRESSURE: 89 MMHG | OXYGEN SATURATION: 100 % | BODY MASS INDEX: 31.25 KG/M2 | RESPIRATION RATE: 16 BRPM | SYSTOLIC BLOOD PRESSURE: 138 MMHG | TEMPERATURE: 97.9 F

## 2024-05-14 DIAGNOSIS — R10.9 RIGHT FLANK PAIN: ICD-10-CM

## 2024-05-14 PROBLEM — O47.02 THREATENED PREMATURE LABOR IN SECOND TRIMESTER: Status: ACTIVE | Noted: 2024-05-14

## 2024-05-14 LAB
ALBUMIN UR-MCNC: NEGATIVE MG/DL
ANION GAP SERPL CALCULATED.3IONS-SCNC: 9 MMOL/L (ref 7–15)
APPEARANCE UR: ABNORMAL
BASOPHILS # BLD AUTO: 0 10E3/UL (ref 0–0.2)
BASOPHILS NFR BLD AUTO: 0 %
BILIRUB UR QL STRIP: NEGATIVE
BUN SERPL-MCNC: 11.1 MG/DL (ref 6–20)
CALCIUM SERPL-MCNC: 9.7 MG/DL (ref 8.6–10)
CHLORIDE SERPL-SCNC: 106 MMOL/L (ref 98–107)
COLOR UR AUTO: ABNORMAL
CREAT SERPL-MCNC: 0.57 MG/DL (ref 0.51–0.95)
CRP SERPL-MCNC: <3 MG/L
DEPRECATED HCO3 PLAS-SCNC: 26 MMOL/L (ref 22–29)
EGFRCR SERPLBLD CKD-EPI 2021: >90 ML/MIN/1.73M2
EOSINOPHIL # BLD AUTO: 0.2 10E3/UL (ref 0–0.7)
EOSINOPHIL NFR BLD AUTO: 2 %
ERYTHROCYTE [DISTWIDTH] IN BLOOD BY AUTOMATED COUNT: 17.1 % (ref 10–15)
GLUCOSE SERPL-MCNC: 97 MG/DL (ref 70–99)
GLUCOSE UR STRIP-MCNC: NEGATIVE MG/DL
HCG UR QL: NEGATIVE
HCT VFR BLD AUTO: 30.9 % (ref 35–47)
HGB BLD-MCNC: 9.1 G/DL (ref 11.7–15.7)
HGB UR QL STRIP: NEGATIVE
HYALINE CASTS: 1 /LPF
IMM GRANULOCYTES # BLD: 0 10E3/UL
IMM GRANULOCYTES NFR BLD: 0 %
KETONES UR STRIP-MCNC: NEGATIVE MG/DL
LEUKOCYTE ESTERASE UR QL STRIP: NEGATIVE
LYMPHOCYTES # BLD AUTO: 2.4 10E3/UL (ref 0.8–5.3)
LYMPHOCYTES NFR BLD AUTO: 25 %
MCH RBC QN AUTO: 20.3 PG (ref 26.5–33)
MCHC RBC AUTO-ENTMCNC: 29.4 G/DL (ref 31.5–36.5)
MCV RBC AUTO: 69 FL (ref 78–100)
MONOCYTES # BLD AUTO: 0.6 10E3/UL (ref 0–1.3)
MONOCYTES NFR BLD AUTO: 6 %
MUCOUS THREADS #/AREA URNS LPF: PRESENT /LPF
NEUTROPHILS # BLD AUTO: 6.1 10E3/UL (ref 1.6–8.3)
NEUTROPHILS NFR BLD AUTO: 66 %
NITRATE UR QL: NEGATIVE
NRBC # BLD AUTO: 0 10E3/UL
NRBC BLD AUTO-RTO: 0 /100
PH UR STRIP: 7 [PH] (ref 5–7)
PLATELET # BLD AUTO: 379 10E3/UL (ref 150–450)
POTASSIUM SERPL-SCNC: 4.2 MMOL/L (ref 3.4–5.3)
RBC # BLD AUTO: 4.48 10E6/UL (ref 3.8–5.2)
RBC URINE: 1 /HPF
SODIUM SERPL-SCNC: 141 MMOL/L (ref 135–145)
SP GR UR STRIP: 1.02 (ref 1–1.03)
SQUAMOUS EPITHELIAL: 15 /HPF
UROBILINOGEN UR STRIP-MCNC: <2 MG/DL
WBC # BLD AUTO: 9.3 10E3/UL (ref 4–11)
WBC URINE: 2 /HPF

## 2024-05-14 PROCEDURE — 80048 BASIC METABOLIC PNL TOTAL CA: CPT | Performed by: EMERGENCY MEDICINE

## 2024-05-14 PROCEDURE — 74176 CT ABD & PELVIS W/O CONTRAST: CPT

## 2024-05-14 PROCEDURE — 81001 URINALYSIS AUTO W/SCOPE: CPT | Performed by: EMERGENCY MEDICINE

## 2024-05-14 PROCEDURE — 81025 URINE PREGNANCY TEST: CPT | Performed by: EMERGENCY MEDICINE

## 2024-05-14 PROCEDURE — 86140 C-REACTIVE PROTEIN: CPT | Performed by: EMERGENCY MEDICINE

## 2024-05-14 PROCEDURE — 36415 COLL VENOUS BLD VENIPUNCTURE: CPT | Performed by: EMERGENCY MEDICINE

## 2024-05-14 PROCEDURE — 99285 EMERGENCY DEPT VISIT HI MDM: CPT | Mod: 25

## 2024-05-14 PROCEDURE — 85025 COMPLETE CBC W/AUTO DIFF WBC: CPT | Performed by: EMERGENCY MEDICINE

## 2024-05-14 PROCEDURE — 250N000011 HC RX IP 250 OP 636: Performed by: EMERGENCY MEDICINE

## 2024-05-14 PROCEDURE — 258N000003 HC RX IP 258 OP 636: Performed by: EMERGENCY MEDICINE

## 2024-05-14 PROCEDURE — 96361 HYDRATE IV INFUSION ADD-ON: CPT

## 2024-05-14 PROCEDURE — 96374 THER/PROPH/DIAG INJ IV PUSH: CPT

## 2024-05-14 RX ORDER — KETOROLAC TROMETHAMINE 15 MG/ML
15 INJECTION, SOLUTION INTRAMUSCULAR; INTRAVENOUS ONCE
Status: COMPLETED | OUTPATIENT
Start: 2024-05-14 | End: 2024-05-14

## 2024-05-14 RX ADMIN — SODIUM CHLORIDE 1000 ML: 9 INJECTION, SOLUTION INTRAVENOUS at 10:04

## 2024-05-14 RX ADMIN — KETOROLAC TROMETHAMINE 15 MG: 15 INJECTION, SOLUTION INTRAMUSCULAR; INTRAVENOUS at 10:05

## 2024-05-14 ASSESSMENT — COLUMBIA-SUICIDE SEVERITY RATING SCALE - C-SSRS
6. HAVE YOU EVER DONE ANYTHING, STARTED TO DO ANYTHING, OR PREPARED TO DO ANYTHING TO END YOUR LIFE?: NO
2. HAVE YOU ACTUALLY HAD ANY THOUGHTS OF KILLING YOURSELF IN THE PAST MONTH?: NO
1. IN THE PAST MONTH, HAVE YOU WISHED YOU WERE DEAD OR WISHED YOU COULD GO TO SLEEP AND NOT WAKE UP?: NO

## 2024-05-14 NOTE — Clinical Note
Carmela Elizalde was seen and treated in our emergency department on 5/14/2024.  She may return to work on 05/15/2024.       If you have any questions or concerns, please don't hesitate to call.      John Beltran, DO

## 2024-05-14 NOTE — ED PROVIDER NOTES
EMERGENCY DEPARTMENT ENCOUNTER      NAME: Carmela OLIVERA Young  AGE: 24 year old female  YOB: 2000  MRN: [id number]  EVALUATION DATE & TIME: No admission date for patient encounter.    PCP: [facility]    ED PROVIDER: John Beltran D.O.      Chief Complaint   Patient presents with    Flank Pain       FINAL IMPRESSION:  1. Right flank pain        ED COURSE & MEDICAL DECISION MAKIN:31 AM I met with the patient to gather history and to perform my initial exam. I discussed the plan for care while in the Emergency Department.         Pertinent Labs & Imaging studies reviewed. (See chart for details)  24 year old female presents to the Emergency Department for evaluation of right-sided flank pain.  Initial differential does include pyelonephritis, urolithiasis, musculoskeletal etiology.  She had no abdominal pain to suggest cholecystitis, appendicitis, cholelithiasis, bowel obstruction, volvulus, mesenteric ischemia, other acute process.  UA does not show any evidence of infection, unlikely represent UTI, especially with a negative CRP as well.  CT imaging does not show any evidence of ureterolithiasis though did show several nephrolithiases, do not believe these to be the cause of the patient's symptoms but did warn the patient about them.  At this time, do not see obvious emergent cause of her symptoms, will discharge with outpatient follow-up with her primary care provider.  Patient was comfortable this plan.  Return precautions were discussed    Medical Decision Making  Obtained supplemental history:Supplemental history obtained?: No  Reviewed external records: External records reviewed?: No  Care impacted by chronic illness:Other: anemia, depression, kidney stones  Care significantly affected by social determinants of health:Alcohol Abuse and/or Recreational Drug Use  Did you consider but not order tests?: Work up considered but not performed and documented in chart, if applicable  Did  you interpret images independently?: Independent interpretation of ECG and images noted in documentation, when applicable.  Consultation discussion with other provider:Did you involve another provider (consultant, , pharmacy, etc.)?: No  Discharge. No recommendations on prescription strength medication(s). See documentation for any additional details.    At the conclusion of the encounter I discussed the results of all of the tests and the disposition. The questions were answered. The patient or family acknowledged understanding and was agreeable with the care plan.        HPI    Patient information was obtained from: patient     Use of : N/A        Carmela JAROD Elizalde is a 24 year old female who presents by walk in for evaluation of right flank pain.    The patient reports she has right flank pain for 2 days. She has hx of right sided kidney stones and says that this feels similar. Denies nausea, vomiting, diarrhea, fevers, chest pain, and shortness of breath. Denies allergies to medications. No tobacco or alcohol use.       REVIEW OF SYSTEMS  Constitutional:  Denies fever, chills, weight loss or weakness  Eyes:  No pain, discharge, redness  HENT:  Denies sore throat, ear pain, congestion  Respiratory: No SOB, wheeze or cough  Cardiovascular:  No CP, palpitations  GI:  Denies abdominal pain, nausea, vomiting, diarrhea. Positive for right flank pain.   : Denies dysuria, hematuria  Musculoskeletal:  Denies any new muscle/joint pain, swelling or loss of function.  Skin:  Denies rash, pallor  Neurologic:  Denies headache, focal weakness or sensory changes  Lymph: Denies swollen nodes    All other systems negative unless noted in HPI.    PAST MEDICAL HISTORY:  Past Medical History:   Diagnosis Date    Kidney stone        PAST SURGICAL HISTORY:  Past Surgical History:   Procedure Laterality Date    AS FRAGMENTING OF KIDNEY STONE       SECTION, TUBAL LIGATION, COMBINED Bilateral 3/8/2023    Procedure:  "REPEAT  SECTION AND BILATERAL TUBAL LIGATION;  Surgeon: Chepe Cole MD;  Location: [facility] OR         CURRENT MEDICATIONS:    No current facility-administered medications for this encounter.     Current Outpatient Medications   Medication Sig Dispense Refill    acetaminophen (TYLENOL) 325 MG tablet Take 3 tablets (975 mg) by mouth every 6 hours      ferrous sulfate (FEROSUL) 325 (65 Fe) MG tablet Take 1 tablet (325 mg) by mouth daily 30 tablet 1    ibuprofen (ADVIL/MOTRIN) 800 MG tablet Take 1 tablet (800 mg) by mouth every 6 hours      oxyCODONE (ROXICODONE) 5 MG tablet Take 1 tablet (5 mg) by mouth every 4 hours as needed for moderate to severe pain 10 tablet 0    Prenatal Vit-Fe Fumarate-FA (PRENATAL MULTIVITAMIN W/IRON) 27-0.8 MG tablet Take 1 tablet by mouth daily           ALLERGIES:  No Known Allergies    FAMILY HISTORY:  History reviewed. No pertinent family history.    SOCIAL HISTORY:  Social History     Socioeconomic History    Marital status: Single   Tobacco Use    Smoking status: Never    Smokeless tobacco: Never   Substance and Sexual Activity    Alcohol use: Not Currently    Drug use: Never    Sexual activity: Yes       VITALS:  Patient Vitals for the past 24 hrs:   BP Temp Temp src Pulse Resp SpO2 Height Weight   24 1129 138/89 -- -- 73 16 100 % -- --   24 0930 127/78 97.9  F (36.6  C) Oral 78 16 100 % 1.499 m (4' 11\") 70.3 kg (155 lb)       PHYSICAL EXAM    VITAL SIGNS: /89   Pulse 73   Temp 97.9  F (36.6  C) (Oral)   Resp 16   Ht 1.499 m (4' 11\")   Wt 70.3 kg (155 lb)   SpO2 100%   BMI 31.31 kg/m      General Appearance: Well-appearing, well-nourished, no acute distress   Head:  Normocephalic, without obvious abnormality, atraumatic  Eyes:  PERRL, conjunctiva/corneas clear, EOM's intact,  ENT:  Lips, mucosa, and tongue normal, membranes are moist without pallor  Neck:  Normal ROM, symmetrical, trachea midline    Cardio:  Regular rate and rhythm, no " murmur, rub or gallop, 2+ pulses symmetric in all extremities  Pulm:  Clear to auscultation bilaterally, respirations unlabored,  Back:  ROM normal, no spinal tenderness, no paraspinal tenderness. Mild right CVA tenderness.   Abdomen:  Soft, non-tender, no rebound or guarding.  Musculoskeletal: Full ROM, no edema, no cyanosis, good ROM of major joints  Integument:  Warm, Dry, No erythema, No rash.    Neurologic:  Alert & oriented.  No focal deficits appreciated.         LABS  Results for orders placed or performed during the hospital encounter of 05/14/24 (from the past 24 hour(s))   CBC with platelets + differential    Narrative    The following orders were created for panel order CBC with platelets + differential.  Procedure                               Abnormality         Status                     ---------                               -----------         ------                     CBC with platelets and d...[[id number]]  Abnormal            Final result                 Please view results for these tests on the individual orders.   Basic metabolic panel   Result Value Ref Range    Sodium 141 135 - 145 mmol/L    Potassium 4.2 3.4 - 5.3 mmol/L    Chloride 106 98 - 107 mmol/L    Carbon Dioxide (CO2) 26 22 - 29 mmol/L    Anion Gap 9 7 - 15 mmol/L    Urea Nitrogen 11.1 6.0 - 20.0 mg/dL    Creatinine 0.57 0.51 - 0.95 mg/dL    GFR Estimate >90 >60 mL/min/1.73m2    Calcium 9.7 8.6 - 10.0 mg/dL    Glucose 97 70 - 99 mg/dL   UA with Microscopic reflex to Culture    Specimen: Urine, Midstream   Result Value Ref Range    Color Urine Light Yellow Colorless, Straw, Light Yellow, Yellow    Appearance Urine Turbid (A) Clear    Glucose Urine Negative Negative mg/dL    Bilirubin Urine Negative Negative    Ketones Urine Negative Negative mg/dL    Specific Gravity Urine 1.022 1.001 - 1.030    Blood Urine Negative Negative    pH Urine 7.0 5.0 - 7.0    Protein Albumin Urine Negative Negative mg/dL    Urobilinogen Urine <2.0 <2.0  mg/dL    Nitrite Urine Negative Negative    Leukocyte Esterase Urine Negative Negative    Mucus Urine Present (A) None Seen /LPF    RBC Urine 1 <=2 /HPF    WBC Urine 2 <=5 /HPF    Squamous Epithelials Urine 15 (H) <=1 /HPF    Hyaline Casts Urine 1 <=2 /LPF    Narrative    Urine Culture not indicated   CRP inflammation   Result Value Ref Range    CRP Inflammation <3.00 <5.00 mg/L   HCG qualitative urine   Result Value Ref Range    hCG Urine Qualitative Negative Negative   CBC with platelets and differential   Result Value Ref Range    WBC Count 9.3 4.0 - 11.0 10e3/uL    RBC Count 4.48 3.80 - 5.20 10e6/uL    Hemoglobin 9.1 (L) 11.7 - 15.7 g/dL    Hematocrit 30.9 (L) 35.0 - 47.0 %    MCV 69 (L) 78 - 100 fL    MCH 20.3 (L) 26.5 - 33.0 pg    MCHC 29.4 (L) 31.5 - 36.5 g/dL    RDW 17.1 (H) 10.0 - 15.0 %    Platelet Count 379 150 - 450 10e3/uL    % Neutrophils 66 %    % Lymphocytes 25 %    % Monocytes 6 %    % Eosinophils 2 %    % Basophils 0 %    % Immature Granulocytes 0 %    NRBCs per 100 WBC 0 <1 /100    Absolute Neutrophils 6.1 1.6 - 8.3 10e3/uL    Absolute Lymphocytes 2.4 0.8 - 5.3 10e3/uL    Absolute Monocytes 0.6 0.0 - 1.3 10e3/uL    Absolute Eosinophils 0.2 0.0 - 0.7 10e3/uL    Absolute Basophils 0.0 0.0 - 0.2 10e3/uL    Absolute Immature Granulocytes 0.0 <=0.4 10e3/uL    Absolute NRBCs 0.0 10e3/uL   Abd/pelvis CT no contrast - Stone Protocol    Narrative    EXAM: CT ABDOMEN PELVIS W/O CONTRAST  LOCATION: [facility]  DATE: 5/14/2024    INDICATION: Right flank pain  COMPARISON: None.  TECHNIQUE: CT scan of the abdomen and pelvis was performed without IV contrast. Multiplanar reformats were obtained. Dose reduction techniques were used.  CONTRAST: None.    FINDINGS:   LOWER CHEST: Visualized lungs are clear. No pleural effusion. Heart size normal with no pericardial effusion.  Decreased density of intracardiac blood pool consistent with nonspecific anemia.    HEPATOBILIARY: Liver is normal.   No calcified gallstones or bile duct dilatation.     PANCREAS: Normal.    SPLEEN: Spleen size normal.    ADRENAL GLANDS: Normal.    KIDNEYS/BLADDER: Two 1 mm nonobstructing right kidney stones and two 2 mm nonobstructing kidney stones. Kidneys, ureters and bladder are otherwise normal.    BOWEL: Normal appendix. Bowel is normal with no obstruction or inflammatory change.    LYMPH NODES: No lymphadenopathy.    VASCULATURE: Normal caliber abdominal aorta.      PELVIC ORGANS: No pelvic mass. Trace amount of free fluid in the pelvis.    MUSCULOSKELETAL: Unremarkable.      Impression    IMPRESSION:   1.  No acute findings in the abdomen and pelvis.  2.  Two 1 mm nonobstructing right kidney stones and two 2 mm nonobstructing kidney stones.   3.  Kidneys, ureters and bladder are otherwise normal.  4.  Trace amount of free fluid in the pelvis.  5.  Decreased density of intracardiac blood pool consistent with nonspecific anemia.           RADIOLOGY  Abd/pelvis CT no contrast - Stone Protocol   Final Result   IMPRESSION:    1.  No acute findings in the abdomen and pelvis.   2.  Two 1 mm nonobstructing right kidney stones and two 2 mm nonobstructing kidney stones.    3.  Kidneys, ureters and bladder are otherwise normal.   4.  Trace amount of free fluid in the pelvis.   5.  Decreased density of intracardiac blood pool consistent with nonspecific anemia.                    MEDICATIONS GIVEN IN THE EMERGENCY:  Medications   sodium chloride 0.9% BOLUS 1,000 mL (0 mLs Intravenous Stopped 5/14/24 1118)   ketorolac (TORADOL) injection 15 mg (15 mg Intravenous $Given 5/14/24 1005)       NEW PRESCRIPTIONS STARTED AT TODAY'S ER VISIT  Discharge Medication List as of 5/14/2024 11:24 AM           I, Chio Gibbs, am serving as a scribe to document services personally performed by John Beltran D.O., based on my observations and the provider's statements to me.  I, John Beltran D.O., attest that Chio Gibbs is acting in a scribe capacity, has  observed my performance of the services and has documented them in accordance with my direction.     John Beltran D.O.  Emergency Medicine  [facility] EMERGENCY ROOM  [facility] [facility] [address]  [phone]  Dept: [phone]       John Beltran,   05/14/24 2626

## 2024-05-14 NOTE — ED TRIAGE NOTES
Pt presents with 2 days of right flank pain. Endorses history of kidney stones. Denies urinary symptoms. Denies nausea or vomiting.      Triage Assessment (Adult)       Row Name 05/14/24 2869          Triage Assessment    Airway WDL WDL        Respiratory WDL    Respiratory WDL WDL        Skin Circulation/Temperature WDL    Skin Circulation/Temperature WDL WDL        Cardiac WDL    Cardiac WDL WDL        Peripheral/Neurovascular WDL    Peripheral Neurovascular WDL WDL        Cognitive/Neuro/Behavioral WDL    Cognitive/Neuro/Behavioral WDL WDL

## 2024-06-19 ENCOUNTER — APPOINTMENT (OUTPATIENT)
Dept: RADIOLOGY | Facility: CLINIC | Age: 24
End: 2024-06-19
Attending: EMERGENCY MEDICINE
Payer: COMMERCIAL

## 2024-06-19 ENCOUNTER — HOSPITAL ENCOUNTER (EMERGENCY)
Facility: CLINIC | Age: 24
Discharge: HOME | End: 2024-06-19
Attending: EMERGENCY MEDICINE | Admitting: EMERGENCY MEDICINE
Payer: COMMERCIAL

## 2024-06-19 VITALS
BODY MASS INDEX: 30.24 KG/M2 | SYSTOLIC BLOOD PRESSURE: 156 MMHG | OXYGEN SATURATION: 100 % | RESPIRATION RATE: 18 BRPM | HEART RATE: 83 BPM | TEMPERATURE: 97.7 F | WEIGHT: 150 LBS | DIASTOLIC BLOOD PRESSURE: 80 MMHG | HEIGHT: 59 IN

## 2024-06-19 DIAGNOSIS — R06.00 DYSPNEA, UNSPECIFIED TYPE: ICD-10-CM

## 2024-06-19 DIAGNOSIS — J06.9 VIRAL URI: ICD-10-CM

## 2024-06-19 LAB
ACANTHOCYTES BLD QL SMEAR: NORMAL
ANION GAP SERPL CALCULATED.3IONS-SCNC: 10 MMOL/L (ref 7–15)
ATRIAL RATE - MUSE: 79 BPM
AUER BODIES BLD QL SMEAR: NORMAL
BASO STIPL BLD QL SMEAR: NORMAL
BASOPHILS # BLD AUTO: 0.1 10E3/UL (ref 0–0.2)
BASOPHILS NFR BLD AUTO: 0 %
BITE CELLS BLD QL SMEAR: NORMAL
BLISTER CELLS BLD QL SMEAR: NORMAL
BUN SERPL-MCNC: 8.3 MG/DL (ref 6–20)
BURR CELLS BLD QL SMEAR: NORMAL
CALCIUM SERPL-MCNC: 9.4 MG/DL (ref 8.6–10)
CHLORIDE SERPL-SCNC: 105 MMOL/L (ref 98–107)
CREAT SERPL-MCNC: 0.58 MG/DL (ref 0.51–0.95)
DACRYOCYTES BLD QL SMEAR: NORMAL
DEPRECATED HCO3 PLAS-SCNC: 23 MMOL/L (ref 22–29)
DIASTOLIC BLOOD PRESSURE - MUSE: NORMAL MMHG
EGFRCR SERPLBLD CKD-EPI 2021: >90 ML/MIN/1.73M2
ELLIPTOCYTES BLD QL SMEAR: NORMAL
EOSINOPHIL # BLD AUTO: 0.6 10E3/UL (ref 0–0.7)
EOSINOPHIL NFR BLD AUTO: 5 %
ERYTHROCYTE [DISTWIDTH] IN BLOOD BY AUTOMATED COUNT: 17.9 % (ref 10–15)
FLUAV RNA SPEC QL NAA+PROBE: NEGATIVE
FLUBV RNA RESP QL NAA+PROBE: NEGATIVE
FRAGMENTS BLD QL SMEAR: NORMAL
GIANT PLATELETS BLD QL SMEAR: NORMAL
GLUCOSE SERPL-MCNC: 99 MG/DL (ref 70–99)
HCT VFR BLD AUTO: 32.3 % (ref 35–47)
HGB BLD-MCNC: 9.7 G/DL (ref 11.7–15.7)
HGB C CRYSTALS: NORMAL
HOWELL-JOLLY BOD BLD QL SMEAR: NORMAL
IMM GRANULOCYTES # BLD: 0 10E3/UL
IMM GRANULOCYTES NFR BLD: 0 %
INTERPRETATION ECG - MUSE: NORMAL
LYMPHOCYTES # BLD AUTO: 2.8 10E3/UL (ref 0.8–5.3)
LYMPHOCYTES NFR BLD AUTO: 22 %
MCH RBC QN AUTO: 20.1 PG (ref 26.5–33)
MCHC RBC AUTO-ENTMCNC: 30 G/DL (ref 31.5–36.5)
MCV RBC AUTO: 67 FL (ref 78–100)
MONOCYTES # BLD AUTO: 0.9 10E3/UL (ref 0–1.3)
MONOCYTES NFR BLD AUTO: 7 %
NEUTROPHILS # BLD AUTO: 8.4 10E3/UL (ref 1.6–8.3)
NEUTROPHILS NFR BLD AUTO: 66 %
NEUTS HYPERSEG BLD QL SMEAR: NORMAL
NRBC # BLD AUTO: 0 10E3/UL
NRBC BLD AUTO-RTO: 0 /100
P AXIS - MUSE: 30 DEGREES
PATH REV: NORMAL
PLAT MORPH BLD: NORMAL
PLATELET # BLD AUTO: 341 10E3/UL (ref 150–450)
POLYCHROMASIA BLD QL SMEAR: NORMAL
POTASSIUM SERPL-SCNC: 3.4 MMOL/L (ref 3.4–5.3)
PR INTERVAL - MUSE: 114 MS
QRS DURATION - MUSE: 102 MS
QT - MUSE: 384 MS
QTC - MUSE: 440 MS
R AXIS - MUSE: 71 DEGREES
RBC # BLD AUTO: 4.82 10E6/UL (ref 3.8–5.2)
RBC AGGLUT BLD QL: NORMAL
RBC MORPH BLD: NORMAL
ROULEAUX BLD QL SMEAR: NORMAL
RSV RNA SPEC NAA+PROBE: NEGATIVE
SARS-COV-2 RNA RESP QL NAA+PROBE: NEGATIVE
SICKLE CELLS BLD QL SMEAR: NORMAL
SMUDGE CELLS BLD QL SMEAR: NORMAL
SODIUM SERPL-SCNC: 138 MMOL/L (ref 135–145)
SPHEROCYTES BLD QL SMEAR: NORMAL
STOMATOCYTES BLD QL SMEAR: NORMAL
SYSTOLIC BLOOD PRESSURE - MUSE: NORMAL MMHG
T AXIS - MUSE: -35 DEGREES
TARGETS BLD QL SMEAR: NORMAL
TOXIC GRANULES BLD QL SMEAR: NORMAL
TROPONIN T SERPL HS-MCNC: <6 NG/L
VARIANT LYMPHS BLD QL SMEAR: NORMAL
VENTRICULAR RATE- MUSE: 79 BPM
WBC # BLD AUTO: 12.7 10E3/UL (ref 4–11)

## 2024-06-19 PROCEDURE — 87637 SARSCOV2&INF A&B&RSV AMP PRB: CPT | Performed by: EMERGENCY MEDICINE

## 2024-06-19 PROCEDURE — 84484 ASSAY OF TROPONIN QUANT: CPT | Performed by: EMERGENCY MEDICINE

## 2024-06-19 PROCEDURE — 85025 COMPLETE CBC W/AUTO DIFF WBC: CPT | Performed by: EMERGENCY MEDICINE

## 2024-06-19 PROCEDURE — 71046 X-RAY EXAM CHEST 2 VIEWS: CPT

## 2024-06-19 PROCEDURE — 99285 EMERGENCY DEPT VISIT HI MDM: CPT | Mod: 25

## 2024-06-19 PROCEDURE — 36415 COLL VENOUS BLD VENIPUNCTURE: CPT | Performed by: EMERGENCY MEDICINE

## 2024-06-19 PROCEDURE — 80048 BASIC METABOLIC PNL TOTAL CA: CPT | Performed by: EMERGENCY MEDICINE

## 2024-06-19 PROCEDURE — 93005 ELECTROCARDIOGRAM TRACING: CPT | Performed by: EMERGENCY MEDICINE

## 2024-06-19 ASSESSMENT — COLUMBIA-SUICIDE SEVERITY RATING SCALE - C-SSRS
1. IN THE PAST MONTH, HAVE YOU WISHED YOU WERE DEAD OR WISHED YOU COULD GO TO SLEEP AND NOT WAKE UP?: NO
6. HAVE YOU EVER DONE ANYTHING, STARTED TO DO ANYTHING, OR PREPARED TO DO ANYTHING TO END YOUR LIFE?: NO
2. HAVE YOU ACTUALLY HAD ANY THOUGHTS OF KILLING YOURSELF IN THE PAST MONTH?: NO

## 2024-06-19 NOTE — ED PROVIDER NOTES
EMERGENCY DEPARTMENT ENCOUNTER     NAME: Carmela OLIVERA Young   AGE: 24 year old female   YOB: 2000   MRN: [id number]   EVALUATION DATE & TIME: No admission date for patient encounter.   PCP: No Ref-Primary, Physician     Chief Complaint   Patient presents with    Shortness of Breath    Chest Pain   :    FINAL IMPRESSION       1. Dyspnea, unspecified type    2. Viral URI           ED COURSE & MEDICAL DECISION MAKING      Pertinent Labs & Imaging studies reviewed. (See chart for details)   24 year old female  presents to the Emergency Department for evaluation of feeling of dyspnea that is exertional in the setting of viral URI symptoms.  Patient does work in a  and is exposed to many illnesses. Initial Vitals Reviewed. Initial exam notable for well-appearing patient who has may be some slightly diminished breath sounds at the left base but no wheezing or crackles.  She has no increased work of breathing, no tachypnea or tachycardia, oxygen saturations are 100%.  She is completely PERC negative and I do not suspect a pulmonary embolus.  I did do an EKG which is generally reassuring and not changed since previous in 2022, and troponin is negative.  There are no signs of anemia.  Chest x-ray does not show a pneumonia.  Viral panel is negative for COVID and influenza.  At this time, will discharge with reassurance and supportive care for presumed diagnosis of viral URI with dyspnea.         At the conclusion of the encounter I discussed the results of all of the tests and the disposition. The questions were answered. The patient or family acknowledged understanding and was agreeable with the care plan.     0 minutes critical care time, see procedure note below for details if relevant    Medical Decision Making    History:  Supplemental history from: Documented in chart and N/A  External Record(s) reviewed: NA    Work Up:  Chart documentation includes differential considered and any EKGs or imaging  independently interpreted by provider, where specified.  In additional to work up documented, I considered the following work up: Documented in chart, if applicable.    External consultation:  Discussion of management with another provider: Documented in chart, if applicable    Complicating factors:  Care impacted by chronic illness: N/A  Care affected by social determinants of health: Alcohol Abuse and/or Recreational Drug Use    Disposition considerations: Discharge. No recommendations on prescription strength medication(s). I considered admission, but ultimately discharged patient with reassuring workup.        MEDICATIONS GIVEN IN THE EMERGENCY:   Medications - No data to display   NEW PRESCRIPTIONS STARTED AT TODAY'S ER VISIT   New Prescriptions    No medications on file     ================================================================   HISTORY OF PRESENT ILLNESS       Patient information was obtained from: Patient    Use of Intrepreter: N/A     Carmela Elizalde is a 24 year old female with no significant history, who presents to this ED for evaluation of shortness of breath and chest tightness.     The patient reports shortness of breath, congestion, cough, sore throat, and chest tightness that started 2 days ago. She states the shortness of breath is worse when walking or standing up. Patient notes she feels like she needs to take deeper breaths than normal.  She reports she works at a  where COVID and flu are frequently seen. Denies being on birth control. Denies smoking.     ================================================================        PAST HISTORY     PAST MEDICAL HISTORY:   Past Medical History:   Diagnosis Date    Kidney stone       PAST SURGICAL HISTORY:   Past Surgical History:   Procedure Laterality Date    AS FRAGMENTING OF KIDNEY STONE       SECTION, TUBAL LIGATION, COMBINED Bilateral 3/8/2023    Procedure: REPEAT  SECTION AND BILATERAL TUBAL LIGATION;  Surgeon:  "Chepe Cole MD;  Location: [facility] OR      CURRENT MEDICATIONS:   acetaminophen (TYLENOL) 325 MG tablet  ferrous sulfate (FEROSUL) 325 (65 Fe) MG tablet  ibuprofen (ADVIL/MOTRIN) 800 MG tablet  oxyCODONE (ROXICODONE) 5 MG tablet  Prenatal Vit-Fe Fumarate-FA (PRENATAL MULTIVITAMIN W/IRON) 27-0.8 MG tablet      ALLERGIES:   No Known Allergies   FAMILY HISTORY:   History reviewed. No pertinent family history.   SOCIAL HISTORY:   Social History     Socioeconomic History    Marital status: Single     Spouse name: None    Number of children: None    Years of education: None    Highest education level: None   Tobacco Use    Smoking status: Never    Smokeless tobacco: Never   Substance and Sexual Activity    Alcohol use: Not Currently    Drug use: Never    Sexual activity: Yes        VITALS  Patient Vitals for the past 24 hrs:   BP Temp Temp src Pulse Resp SpO2 Height Weight   06/19/24 1716 (!) 141/98 97.7  F (36.5  C) Temporal 88 16 100 % 1.499 m (4' 11\") 68 kg (150 lb)        ================================================================    PHYSICAL EXAM     VITAL SIGNS: BP (!) 141/98   Pulse 88   Temp 97.7  F (36.5  C) (Temporal)   Resp 16   Ht 1.499 m (4' 11\")   Wt 68 kg (150 lb)   SpO2 100%   BMI 30.30 kg/m     Constitutional:  Awake, no acute distress   HENT:  Atraumatic, oropharynx without exudate or erythema, membranes moist. Nasal congestion.   Lymph:  No adenopathy  Eyes: EOM intact, PERRL, no injection  Neck: Supple  Respiratory:  Clear to auscultation bilaterally, no wheezes or crackles. Diminished breath sounds at left base.    Cardiovascular:  Regular rate and rhythm, single S1 and S2   GI:  Soft, nontender, nondistended, no rebound or guarding   Musculoskeletal:  Moves all extremities, no lower extremity edema, no deformities    Skin:  Warm, dry  Neurologic:  Alert and oriented x3, no focal deficits noted       ================================================================  LAB "       All pertinent labs reviewed and interpreted.   Labs Ordered and Resulted from Time of ED Arrival to Time of ED Departure   CBC WITH PLATELETS AND DIFFERENTIAL - Abnormal       Result Value    WBC Count 12.7 (*)     RBC Count 4.82      Hemoglobin 9.7 (*)     Hematocrit 32.3 (*)     MCV 67 (*)     MCH 20.1 (*)     MCHC 30.0 (*)     RDW 17.9 (*)     Platelet Count 341      % Neutrophils 66      % Lymphocytes 22      % Monocytes 7      % Eosinophils 5      % Basophils 0      % Immature Granulocytes 0      NRBCs per 100 WBC 0      Absolute Neutrophils 8.4 (*)     Absolute Lymphocytes 2.8      Absolute Monocytes 0.9      Absolute Eosinophils 0.6      Absolute Basophils 0.1      Absolute Immature Granulocytes 0.0      Absolute NRBCs 0.0     INFLUENZA A/B, RSV, & SARS-COV2 PCR - Normal    Influenza A PCR Negative      Influenza B PCR Negative      RSV PCR Negative      SARS CoV2 PCR Negative     BASIC METABOLIC PANEL - Normal    Sodium 138      Potassium 3.4      Chloride 105      Carbon Dioxide (CO2) 23      Anion Gap 10      Urea Nitrogen 8.3      Creatinine 0.58      GFR Estimate >90      Calcium 9.4      Glucose 99     TROPONIN T, HIGH SENSITIVITY - Normal    Troponin T, High Sensitivity <6     RBC AND PLATELET MORPHOLOGY    RBC Morphology Confirmed RBC Indices      Platelet Assessment        Value: Automated Count Confirmed. Platelet morphology is normal.    Giant Platelets        Acanthocytes        Shell Rods        Basophilic Stippling        Bite Cells        Blister Cells        Oren Cells        Elliptocytes        Hgb C Crystals        Paz-Jolly Bodies        Hypersegmented Neutrophils        Polychromasia        RBC agglutination        RBC Fragments        Reactive Lymphocytes        Rouleaux        Sickle Cells        Smudge Cells        Spherocytes        Stomatocytes        Target Cells        Teardrop Cells        Toxic Neutrophils        Pathologist Review Comments (Blood)             ===============================================================  RADIOLOGY       Reviewed all pertinent imaging. Please see official radiology report.   XR Chest 2 Views   Final Result   IMPRESSION: Negative chest.            ================================================================  EKG       EKG reviewed interpreted by me shows sinus rhythm with rate of 79, normal axis, QTc 440 with no acute ST or or T wave changes since December 2022 when inferior T wave inversions were already present  I have independently reviewed and interpreted the EKG(s) documented above.     ================================================================  PROCEDURES         I, Krystle Hearn, am serving as a scribe to document services personally performed by Dr. Bingham based on my observation and the provider's statements to me. I, Argentina Bingham MD attest that Krystle Hearn, is acting in a scribe capacity, has observed my performance of the services and has documented them in accordance with my direction.     Argentina Bingham M.D.   Emergency Medicine   [facility] [place], [place] EMERGENCY ROOM  [address] [address]  [phone]  Dept: [phone]      Argentina Bingham MD  06/19/24 1952

## 2024-06-19 NOTE — ED TRIAGE NOTES
Pt c/o SOB and chest pain with cough that started about 2 days ago. The SOB is worse when trying to lay down and with exertion. The chest pain started after and just feels like a tightness in the chest. 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 She denies any hx of cardiac or asthma. Wants to talk with MD prior to EKG.      Triage Assessment (Adult)       Row Name 06/19/24 0482          Triage Assessment    Airway WDL WDL        Respiratory WDL    Respiratory WDL WDL        Skin Circulation/Temperature WDL    Skin Circulation/Temperature WDL WDL        Cardiac WDL    Cardiac WDL X;chest pain        Chest Pain Assessment    Chest Pain Location midsternal     Character tightness     Duration 2 days     Precipitating Factors other (see comments)  Cough     Alleviating Factors rest     Associated Signs/Symptoms hypertension        Peripheral/Neurovascular WDL    Peripheral Neurovascular WDL WDL        Cognitive/Neuro/Behavioral WDL    Cognitive/Neuro/Behavioral WDL WDL

## 2024-06-19 NOTE — LETTER
June 19, 2024      To Whom It May Concern:      Carmela Elizalde was seen in our Emergency Department today, 06/19/24.  I expect her condition to improve over the next 2 days.  She may return to work/school when improved.    Sincerely,        Danae Nelson RN

## 2024-06-20 NOTE — DISCHARGE INSTRUCTIONS
Fortunately, all of the tests in the ER are reassuring.  There are no signs of pneumonia, no problems with your heart.  Most likely this is from a viral illness and you tested negative for influenza and COVID.

## 2025-07-01 ENCOUNTER — HOSPITAL ENCOUNTER (EMERGENCY)
Facility: CLINIC | Age: 25
Discharge: HOME | End: 2025-07-01
Payer: COMMERCIAL

## 2025-07-01 ENCOUNTER — APPOINTMENT (OUTPATIENT)
Dept: RADIOLOGY | Facility: CLINIC | Age: 25
End: 2025-07-01
Attending: EMERGENCY MEDICINE
Payer: COMMERCIAL

## 2025-07-01 VITALS
RESPIRATION RATE: 18 BRPM | DIASTOLIC BLOOD PRESSURE: 72 MMHG | BODY MASS INDEX: 27.48 KG/M2 | OXYGEN SATURATION: 100 % | HEART RATE: 80 BPM | TEMPERATURE: 98.2 F | HEIGHT: 60 IN | WEIGHT: 140 LBS | SYSTOLIC BLOOD PRESSURE: 130 MMHG

## 2025-07-01 DIAGNOSIS — M25.562 ACUTE PAIN OF LEFT KNEE: ICD-10-CM

## 2025-07-01 PROCEDURE — 73562 X-RAY EXAM OF KNEE 3: CPT | Mod: LT

## 2025-07-01 PROCEDURE — 99283 EMERGENCY DEPT VISIT LOW MDM: CPT | Mod: 25

## 2025-07-01 NOTE — DISCHARGE INSTRUCTIONS
Your emergency department evaluation is reassuring.  Your x-ray did not show any sign of fracture.  I recommend taking Tylenol or ibuprofen as needed for your pain and intermittently icing.  I also recommend using a knee brace for support if it helps your pain. If your symptoms do not improve, please schedule an appointment with [facility] for further evaluation. I have attached the information for [facility] to your discharge paperwork.
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
## 2025-07-01 NOTE — ED NOTES
Pt given crutches and educated on use. Pt has no questions and able to use crutches appropriately.
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
## (undated) DEVICE — SUCTION KIWI VAC PRO CUP DELIVERY SYSTEM VAC-6000S

## (undated) DEVICE — SOL NACL 0.9% IRRIG 1000ML BOTTLE 2F7124

## (undated) DEVICE — DRESSING MEPILEX BORDER POST-OP 4X10

## (undated) DEVICE — PLATE GROUNDING ADULT W/CORD 9165L

## (undated) DEVICE — SUTURE VICRYL+ 3-0 27IN CT-1 UND VCP258H

## (undated) DEVICE — GOWN IMPERVIOUS BREATHABLE 2XL/XLONG

## (undated) DEVICE — PACK MINOR SINGLE BASIN SSK3001

## (undated) DEVICE — PACK MAJOR BASIN 673

## (undated) DEVICE — BLADE CLIPPER PIVOT PURPLE DISP 9660

## (undated) DEVICE — SOL WATER IRRIG 1000ML BOTTLE 2F7114

## (undated) DEVICE — SYR 03ML LL W/O NDL 309657

## (undated) DEVICE — GOWN SURGICAL SMARTGOWN 2XL 89075

## (undated) DEVICE — ESU LIGASURE DISSECTOR EXACT LF2019

## (undated) DEVICE — DRSG STERI STRIP 1/4X4" R1546

## (undated) DEVICE — GLOVE SURG PI ULTRA TOUCH M SZ 8-1/2 LF

## (undated) DEVICE — NEEDLE HYPO 22X1-1/2 SAFETY 305900

## (undated) DEVICE — CUSTOM PACK C-SECTION LHE

## (undated) DEVICE — SUCTION MANIFOLD NEPTUNE 2 SYS 1 PORT 702-025-000

## (undated) DEVICE — PREP CHLORAPREP 26ML TINTED HI-LITE ORANGE 930815

## (undated) DEVICE — SU VICRYL 0 CT-1 36" J946H

## (undated) DEVICE — SUTURE VICRYL+ 0 36IN CT-1 UND VCP946H

## (undated) DEVICE — SU PLAIN 0 TIE 54" S104H

## (undated) DEVICE — SU PLAIN 3-0 XLH  27" 52T

## (undated) RX ORDER — FENTANYL CITRATE-0.9 % NACL/PF 10 MCG/ML
PLASTIC BAG, INJECTION (ML) INTRAVENOUS
Status: DISPENSED
Start: 2023-03-08

## (undated) RX ORDER — CEFAZOLIN SODIUM 1 G/3ML
INJECTION, POWDER, FOR SOLUTION INTRAMUSCULAR; INTRAVENOUS
Status: DISPENSED
Start: 2023-03-08

## (undated) RX ORDER — DEXAMETHASONE SODIUM PHOSPHATE 4 MG/ML
INJECTION, SOLUTION INTRA-ARTICULAR; INTRALESIONAL; INTRAMUSCULAR; INTRAVENOUS; SOFT TISSUE
Status: DISPENSED
Start: 2023-03-08

## (undated) RX ORDER — BUPIVACAINE HYDROCHLORIDE 7.5 MG/ML
INJECTION, SOLUTION INTRASPINAL
Status: DISPENSED
Start: 2023-03-08

## (undated) RX ORDER — ONDANSETRON 2 MG/ML
INJECTION INTRAMUSCULAR; INTRAVENOUS
Status: DISPENSED
Start: 2023-03-08

## (undated) RX ORDER — MORPHINE SULFATE 1 MG/ML
INJECTION, SOLUTION EPIDURAL; INTRATHECAL; INTRAVENOUS
Status: DISPENSED
Start: 2023-03-08